# Patient Record
Sex: FEMALE | Race: WHITE | Employment: FULL TIME | URBAN - METROPOLITAN AREA
[De-identification: names, ages, dates, MRNs, and addresses within clinical notes are randomized per-mention and may not be internally consistent; named-entity substitution may affect disease eponyms.]

---

## 2017-01-30 ENCOUNTER — OFFICE VISIT (OUTPATIENT)
Dept: INTERNAL MEDICINE CLINIC | Age: 47
End: 2017-01-30

## 2017-01-30 VITALS
RESPIRATION RATE: 16 BRPM | DIASTOLIC BLOOD PRESSURE: 80 MMHG | OXYGEN SATURATION: 95 % | WEIGHT: 167.4 LBS | TEMPERATURE: 99.1 F | BODY MASS INDEX: 29.66 KG/M2 | HEART RATE: 92 BPM | HEIGHT: 63 IN | SYSTOLIC BLOOD PRESSURE: 100 MMHG

## 2017-01-30 DIAGNOSIS — F43.21 GRIEF: ICD-10-CM

## 2017-01-30 DIAGNOSIS — R03.0 ELEVATED BLOOD PRESSURE (NOT HYPERTENSION): ICD-10-CM

## 2017-01-30 DIAGNOSIS — Z00.00 PHYSICAL EXAM: Primary | ICD-10-CM

## 2017-01-30 DIAGNOSIS — R09.81 SINUS CONGESTION: ICD-10-CM

## 2017-01-30 RX ORDER — FLUTICASONE PROPIONATE 50 MCG
2 SPRAY, SUSPENSION (ML) NASAL DAILY
Qty: 1 BOTTLE | Refills: 11
Start: 2017-01-30 | End: 2018-07-09

## 2017-01-30 NOTE — PROGRESS NOTES
HISTORY OF PRESENT ILLNESS  Lakisha Dixon is a 48 y.o. female. OLIVE Cash Esteves is seen today for a CPE and follow up of chronic problems. Social history notable for a significant amount of stress and sadness related to the death of her son who suffered a drug overdose. She has excellent family support and feels she is working through things. She had some grief counseling. In addition, she is also having some stress with work. Preventive medicine. She due for a complete physical examination and routine screening laboratory studies. Also, due for gyn care which is scheduled for today with her gynecologist.  She declines the Tdap booster. New problems reviewed. She has elevated blood pressure at times, actually looks very good today. Continue to monitor and call if readings are persistently high. She has sinus pain and I have advised a nasal steroid. Review of systems notable for some neck pain and muscle pain. She also is having some hot flashes. Regarding chronic psychiatric illness, up to date with psychiatrist.    Yoko/elodia       We counseled regarding healthy lifestyle issues including diet, exercise and stress management. Family history, social history, etc. Are reviewed and updated, see electronic record. I have reviewed/discussed the above normal BMI with the patient. I have recommended the following interventions: dietary needs education and encourage exercise . The plan is as follows: I have counseled this patient on diet and exercise regimens. .        Review of Systems   Constitutional: Negative for weight loss. HENT: Positive for congestion. Eyes: Positive for pain. Respiratory: Negative. Cardiovascular: Negative for chest pain, palpitations, leg swelling and PND. Musculoskeletal: Positive for myalgias. Neurological: Negative for focal weakness. Psychiatric/Behavioral: Positive for depression. The patient has insomnia.         Physical Exam   Constitutional: She is oriented to person, place, and time. She appears well-developed and well-nourished. No distress. HENT:   Head: Normocephalic and atraumatic. Right Ear: Tympanic membrane, external ear and ear canal normal.   Left Ear: Tympanic membrane, external ear and ear canal normal.   Eyes: EOM are normal. Pupils are equal, round, and reactive to light. Right eye exhibits no discharge. Left eye exhibits no discharge. Neck: Normal range of motion. Neck supple. Carotid bruit is not present. No thyromegaly present. Cardiovascular: Normal rate, regular rhythm, normal heart sounds and intact distal pulses. Exam reveals no gallop and no friction rub. No murmur heard. Pulmonary/Chest: Effort normal and breath sounds normal. No respiratory distress. She has no wheezes. She has no rales. Abdominal: Soft. Bowel sounds are normal. She exhibits no distension and no mass. There is no tenderness. There is no rebound and no guarding. Musculoskeletal: Normal range of motion. She exhibits no edema or tenderness. Lymphadenopathy:     She has no cervical adenopathy. Neurological: She is alert and oriented to person, place, and time. She has normal reflexes. Skin: Skin is warm and dry. No rash noted. Psychiatric: She has a normal mood and affect. Her behavior is normal.   Nursing note and vitals reviewed. ASSESSMENT and PLAN  Charis Peguero was seen today for complete physical and sinus pain. Diagnoses and all orders for this visit:    Physical exam  -     TSH 3RD GENERATION  -     VITAMIN D, 25 HYDROXY  -     METABOLIC PANEL, COMPREHENSIVE  -     CBC WITH AUTOMATED DIFF  -     HEMOGLOBIN A1C WITH EAG  -     LIPID PANEL  -     URINALYSIS W/ RFLX MICROSCOPIC    Grief- see hpi    Elevated blood pressure (not hypertension)- see hpi    Sinus congestion  -     fluticasone (FLONASE) 50 mcg/actuation nasal spray; 2 Sprays by Both Nostrils route daily.     This has been fully explained to the patient, who indicates understanding.

## 2017-01-30 NOTE — MR AVS SNAPSHOT
Visit Information Date & Time Provider Department Dept. Phone Encounter #  
 1/30/2017 10:00 AM Brielle Luke, [facility] Internal Medicine [phone] [id number] Follow-up Instructions Return in about 1 year (around 1/30/2018). Upcoming Health Maintenance Date Due  
 BREAST CANCER SCRN MAMMOGRAM 11/1/2015 PAP AKA CERVICAL CYTOLOGY 7/1/2017 DTaP/Tdap/Td series (2 - Td) 1/30/2027 Allergies as of 1/30/2017  Review Complete On: 1/30/2017 By: Brielle Luke MD  
  
 Severity Noted Reaction Type Reactions Aleve [Naproxen Sodium]  11/20/2009    Other (comments) Oral ulcer Nsaids (Non-steroidal Anti-inflammatory Drug)  11/23/2010    Other (comments) Ulcerated mucous membranes Pcn [Penicillins]  11/20/2009    Hives Percocet [Oxycodone-acetaminophen]  11/20/2009    Itching Current Immunizations  Reviewed on 1/30/2017 Name Date  
 TD Vaccine 11/1/2007 Reviewed by Brielle Luke MD on 1/30/2017 at 10:56 AM  
You Were Diagnosed With   
  
 Codes Comments Physical exam    -  Primary ICD-10-CM: Z00.00 ICD-9-CM: V70.9 Grief     ICD-10-CM: M48.85 ICD-9-CM: 309.0 Elevated blood pressure (not hypertension)     ICD-10-CM: R03.0 ICD-9-CM: 796.2 Sinus congestion     ICD-10-CM: R09.81 ICD-9-CM: 478.19 Vitals BP Pulse Temp Resp Height(growth percentile) Weight(growth percentile) 100/80 (BP 1 Location: Right arm, BP Patient Position: Sitting) 92 99.1 °F (37.3 °C) (Oral) 16 5' 3\" (1.6 m) 167 lb 6.4 oz (75.9 kg) SpO2 BMI OB Status Smoking Status 95% 29.65 kg/m2 Hysterectomy Former Smoker BMI and BSA Data Body Mass Index Body Surface Area  
 29.65 kg/m 2 1.84 m 2 Preferred Pharmacy Pharmacy Name Phone [id number] Analia Mccormick, [address] [place] [address] [phone] Your Updated Medication List  
  
   
 This list is accurate as of: 1/30/17 11:04 AM.  Always use your most recent med list.  
  
  
  
  
 ATIVAN 0.5 mg tablet Generic drug:  LORazepam  
Take  by mouth. Biotin 2,500 mcg Cap Take  by mouth. cholecalciferol 1,000 unit tablet Commonly known as:  VITAMIN D3 Take 5 Tabs by mouth daily. Takes as a 5000 unit cap EPIDUO 0.1-2.5 % Gel Generic drug:  Adapalene-Benzoyl Peroxide  
by Apply Externally route. FISH OIL 1,000 mg Cap Generic drug:  omega-3 fatty acids-vitamin e Take 1 Cap by mouth nightly. fluticasone 50 mcg/actuation nasal spray Commonly known as:  Cathlyn Jefry 2 Sprays by Both Nostrils route daily. LaMICtal 150 mg tablet Generic drug:  lamoTRIgine Take  by mouth daily. traMADol 50 mg tablet Commonly known as:  ULTRAM  
Take 1 tablet by mouth every six (6) hours as needed for Pain. tretinoin 0.1 % topical cream  
Commonly known as:  RETIN-A  
  
 TYLENOL 325 mg tablet Generic drug:  acetaminophen Take  by mouth every four (4) hours as needed. valACYclovir 1 gram tablet Commonly known as:  VALTREX  
TAKE AS DIRECTED We Performed the Following CBC WITH AUTOMATED DIFF [69198 CPT(R)] HEMOGLOBIN A1C WITH EAG [78017 CPT(R)] LIPID PANEL [14825 CPT(R)] METABOLIC PANEL, COMPREHENSIVE [50342 CPT(R)] TSH 3RD GENERATION [58999 CPT(R)] URINALYSIS W/ RFLX MICROSCOPIC [29245 CPT(R)] VITAMIN D, 25 HYDROXY [id number] CPT(R)] Follow-up Instructions Return in about 1 year (around 1/30/2018). Introducing [facility]! Dear Kyree Anderson: 
Thank you for requesting a [employer] account. Our records indicate that you already have an active [employer] account. You can access your account anytime at [email or web address] Did you know that you can access your hospital and ER discharge instructions at any time in [employer]?   You can also review all of your test results from your hospital stay or ER visit. Additional Information If you have questions, please visit the Frequently Asked Questions section of the [employer] website at [email or web address] Remember, [employer] is NOT to be used for urgent needs. For medical emergencies, dial 911. Now available from your iPhone and Android! Please provide this summary of care documentation to your next provider. Your primary care clinician is listed as TERRELL RO. If you have any questions after today's visit, please call [phone].

## 2017-01-31 LAB
25(OH)D3+25(OH)D2 SERPL-MCNC: 25 NG/ML (ref 30–100)
ALBUMIN SERPL-MCNC: 4.6 G/DL (ref 3.5–5.5)
ALBUMIN/GLOB SERPL: 1.9 {RATIO} (ref 1.1–2.5)
ALP SERPL-CCNC: 86 IU/L (ref 39–117)
ALT SERPL-CCNC: 21 IU/L (ref 0–32)
APPEARANCE UR: CLEAR
AST SERPL-CCNC: 20 IU/L (ref 0–40)
BASOPHILS # BLD AUTO: 0 X10E3/UL (ref 0–0.2)
BASOPHILS NFR BLD AUTO: 1 %
BILIRUB SERPL-MCNC: 0.4 MG/DL (ref 0–1.2)
BILIRUB UR QL STRIP: NEGATIVE
BUN SERPL-MCNC: 12 MG/DL (ref 6–24)
BUN/CREAT SERPL: 15 (ref 9–23)
CALCIUM SERPL-MCNC: 9.8 MG/DL (ref 8.7–10.2)
CHLORIDE SERPL-SCNC: 100 MMOL/L (ref 96–106)
CHOLEST SERPL-MCNC: 269 MG/DL (ref 100–199)
CO2 SERPL-SCNC: 26 MMOL/L (ref 18–29)
COLOR UR: YELLOW
COMMENT, 011824: ABNORMAL
CREAT SERPL-MCNC: 0.81 MG/DL (ref 0.57–1)
EOSINOPHIL # BLD AUTO: 0.1 X10E3/UL (ref 0–0.4)
EOSINOPHIL NFR BLD AUTO: 2 %
ERYTHROCYTE [DISTWIDTH] IN BLOOD BY AUTOMATED COUNT: 13.4 % (ref 12.3–15.4)
EST. AVERAGE GLUCOSE BLD GHB EST-MCNC: 126 MG/DL
GLOBULIN SER CALC-MCNC: 2.4 G/DL (ref 1.5–4.5)
GLUCOSE SERPL-MCNC: 92 MG/DL (ref 65–99)
GLUCOSE UR QL: NEGATIVE
HBA1C MFR BLD: 6 % (ref 4.8–5.6)
HCT VFR BLD AUTO: 46.5 % (ref 34–46.6)
HDLC SERPL-MCNC: 51 MG/DL
HGB BLD-MCNC: 16 G/DL (ref 11.1–15.9)
HGB UR QL STRIP: NEGATIVE
IMM GRANULOCYTES # BLD: 0 X10E3/UL (ref 0–0.1)
IMM GRANULOCYTES NFR BLD: 0 %
KETONES UR QL STRIP: NEGATIVE
LDLC SERPL CALC-MCNC: 198 MG/DL (ref 0–99)
LEUKOCYTE ESTERASE UR QL STRIP: NEGATIVE
LYMPHOCYTES # BLD AUTO: 2 X10E3/UL (ref 0.7–3.1)
LYMPHOCYTES NFR BLD AUTO: 26 %
MCH RBC QN AUTO: 31.6 PG (ref 26.6–33)
MCHC RBC AUTO-ENTMCNC: 34.4 G/DL (ref 31.5–35.7)
MCV RBC AUTO: 92 FL (ref 79–97)
MICRO URNS: NORMAL
MONOCYTES # BLD AUTO: 0.5 X10E3/UL (ref 0.1–0.9)
MONOCYTES NFR BLD AUTO: 6 %
NEUTROPHILS # BLD AUTO: 4.9 X10E3/UL (ref 1.4–7)
NEUTROPHILS NFR BLD AUTO: 65 %
NITRITE UR QL STRIP: NEGATIVE
PH UR STRIP: 6 [PH] (ref 5–7.5)
PLATELET # BLD AUTO: 306 X10E3/UL (ref 150–379)
POTASSIUM SERPL-SCNC: 4.2 MMOL/L (ref 3.5–5.2)
PROT SERPL-MCNC: 7 G/DL (ref 6–8.5)
PROT UR QL STRIP: NORMAL
RBC # BLD AUTO: 5.07 X10E6/UL (ref 3.77–5.28)
SODIUM SERPL-SCNC: 141 MMOL/L (ref 134–144)
SP GR UR: 1.03 (ref 1–1.03)
TRIGL SERPL-MCNC: 102 MG/DL (ref 0–149)
TSH SERPL DL<=0.005 MIU/L-ACNC: 1.31 UIU/ML (ref 0.45–4.5)
UROBILINOGEN UR STRIP-MCNC: 0.2 MG/DL (ref 0.2–1)
VLDLC SERPL CALC-MCNC: 20 MG/DL (ref 5–40)
WBC # BLD AUTO: 7.5 X10E3/UL (ref 3.4–10.8)

## 2017-02-06 DIAGNOSIS — E78.5 HYPERLIPIDEMIA, UNSPECIFIED HYPERLIPIDEMIA TYPE: Primary | ICD-10-CM

## 2017-05-03 ENCOUNTER — DOCUMENTATION ONLY (OUTPATIENT)
Dept: INTERNAL MEDICINE CLINIC | Age: 47
End: 2017-05-03

## 2017-05-03 NOTE — PROGRESS NOTES
[facility] is requesting office notes/labs on patient for an upcoming appointment, 6.12.17. Per Dr. Azalea Hough, patient needs to sign an OPHELIA form. After a failed attempt to call the patient, an OPHELIA form was mailed to Ms. Stephen Bland today with a self-addressed stamped envelope. Will await for this to be sent back before sending any office notes.

## 2017-05-04 ENCOUNTER — TELEPHONE (OUTPATIENT)
Dept: INTERNAL MEDICINE CLINIC | Age: 47
End: 2017-05-04

## 2017-05-04 NOTE — TELEPHONE ENCOUNTER
Advised pt I did not see in chart where anyone from our office had called. May have been an automated call. She states she would check her message again when she gets home - thought it was from us.

## 2017-05-23 ENCOUNTER — TELEPHONE (OUTPATIENT)
Dept: INTERNAL MEDICINE CLINIC | Age: 47
End: 2017-05-23

## 2017-05-23 NOTE — TELEPHONE ENCOUNTER
Patient is very dissatisfied with the phychiatrist she has been seeing for 16 years. Is asking for Dr. Ricardo Camarillo recommendation.

## 2017-05-25 NOTE — TELEPHONE ENCOUNTER
Spoke with pt - advised MD asked me to mail our list to her - he has circled the groups he likes to use. In the meantime she should call her insurance company to see which one is in her network.

## 2017-08-01 LAB
CHOLEST SERPL-MCNC: 224 MG/DL (ref 100–199)
HDLC SERPL-MCNC: 56 MG/DL
LDLC SERPL CALC-MCNC: 152 MG/DL (ref 0–99)
TRIGL SERPL-MCNC: 79 MG/DL (ref 0–149)
VLDLC SERPL CALC-MCNC: 16 MG/DL (ref 5–40)

## 2018-02-03 RX ORDER — VALACYCLOVIR HYDROCHLORIDE 1 G/1
TABLET, FILM COATED ORAL
Qty: 12 TAB | Refills: 1 | Status: SHIPPED | OUTPATIENT
Start: 2018-02-03 | End: 2019-05-16 | Stop reason: SDUPTHER

## 2018-07-02 DIAGNOSIS — R73.09 OTHER ABNORMAL GLUCOSE: Primary | ICD-10-CM

## 2018-07-02 DIAGNOSIS — Z79.899 ENCOUNTER FOR LONG-TERM (CURRENT) USE OF MEDICATIONS: ICD-10-CM

## 2018-07-02 DIAGNOSIS — E55.9 VITAMIN D DEFICIENCY: ICD-10-CM

## 2018-07-02 DIAGNOSIS — E78.49 OTHER HYPERLIPIDEMIA: ICD-10-CM

## 2018-07-03 LAB
25(OH)D3+25(OH)D2 SERPL-MCNC: 31.7 NG/ML (ref 30–100)
ALBUMIN SERPL-MCNC: 4.5 G/DL (ref 3.5–5.5)
ALBUMIN/GLOB SERPL: 2 {RATIO} (ref 1.2–2.2)
ALP SERPL-CCNC: 81 IU/L (ref 39–117)
ALT SERPL-CCNC: 22 IU/L (ref 0–32)
APPEARANCE UR: CLEAR
AST SERPL-CCNC: 23 IU/L (ref 0–40)
BASOPHILS # BLD AUTO: 0 X10E3/UL (ref 0–0.2)
BASOPHILS NFR BLD AUTO: 0 %
BILIRUB SERPL-MCNC: 0.3 MG/DL (ref 0–1.2)
BILIRUB UR QL STRIP: NEGATIVE
BUN SERPL-MCNC: 14 MG/DL (ref 6–24)
BUN/CREAT SERPL: 15 (ref 9–23)
CALCIUM SERPL-MCNC: 9.5 MG/DL (ref 8.7–10.2)
CHLORIDE SERPL-SCNC: 106 MMOL/L (ref 96–106)
CHOLEST SERPL-MCNC: 218 MG/DL (ref 100–199)
CO2 SERPL-SCNC: 23 MMOL/L (ref 20–29)
COLOR UR: YELLOW
CREAT SERPL-MCNC: 0.94 MG/DL (ref 0.57–1)
EOSINOPHIL # BLD AUTO: 0.2 X10E3/UL (ref 0–0.4)
EOSINOPHIL NFR BLD AUTO: 3 %
ERYTHROCYTE [DISTWIDTH] IN BLOOD BY AUTOMATED COUNT: 14 % (ref 12.3–15.4)
EST. AVERAGE GLUCOSE BLD GHB EST-MCNC: 120 MG/DL
GLOBULIN SER CALC-MCNC: 2.2 G/DL (ref 1.5–4.5)
GLUCOSE SERPL-MCNC: 95 MG/DL (ref 65–99)
GLUCOSE UR QL: NEGATIVE
HBA1C MFR BLD: 5.8 % (ref 4.8–5.6)
HCT VFR BLD AUTO: 42.2 % (ref 34–46.6)
HDLC SERPL-MCNC: 51 MG/DL
HGB BLD-MCNC: 14.2 G/DL (ref 11.1–15.9)
HGB UR QL STRIP: NEGATIVE
IMM GRANULOCYTES # BLD: 0 X10E3/UL (ref 0–0.1)
IMM GRANULOCYTES NFR BLD: 0 %
KETONES UR QL STRIP: NEGATIVE
LDLC SERPL CALC-MCNC: 146 MG/DL (ref 0–99)
LEUKOCYTE ESTERASE UR QL STRIP: NEGATIVE
LYMPHOCYTES # BLD AUTO: 1.8 X10E3/UL (ref 0.7–3.1)
LYMPHOCYTES NFR BLD AUTO: 33 %
MCH RBC QN AUTO: 31.5 PG (ref 26.6–33)
MCHC RBC AUTO-ENTMCNC: 33.6 G/DL (ref 31.5–35.7)
MCV RBC AUTO: 94 FL (ref 79–97)
MICRO URNS: NORMAL
MONOCYTES # BLD AUTO: 0.4 X10E3/UL (ref 0.1–0.9)
MONOCYTES NFR BLD AUTO: 7 %
NEUTROPHILS # BLD AUTO: 3.2 X10E3/UL (ref 1.4–7)
NEUTROPHILS NFR BLD AUTO: 57 %
NITRITE UR QL STRIP: NEGATIVE
PH UR STRIP: 5 [PH] (ref 5–7.5)
PLATELET # BLD AUTO: 275 X10E3/UL (ref 150–379)
POTASSIUM SERPL-SCNC: 4.6 MMOL/L (ref 3.5–5.2)
PROT SERPL-MCNC: 6.7 G/DL (ref 6–8.5)
PROT UR QL STRIP: NEGATIVE
RBC # BLD AUTO: 4.51 X10E6/UL (ref 3.77–5.28)
SODIUM SERPL-SCNC: 144 MMOL/L (ref 134–144)
SP GR UR: 1.02 (ref 1–1.03)
TRIGL SERPL-MCNC: 104 MG/DL (ref 0–149)
UROBILINOGEN UR STRIP-MCNC: 0.2 MG/DL (ref 0.2–1)
VLDLC SERPL CALC-MCNC: 21 MG/DL (ref 5–40)
WBC # BLD AUTO: 5.6 X10E3/UL (ref 3.4–10.8)

## 2018-07-09 ENCOUNTER — OFFICE VISIT (OUTPATIENT)
Dept: INTERNAL MEDICINE CLINIC | Age: 48
End: 2018-07-09

## 2018-07-09 VITALS
DIASTOLIC BLOOD PRESSURE: 90 MMHG | BODY MASS INDEX: 31.25 KG/M2 | WEIGHT: 176.4 LBS | SYSTOLIC BLOOD PRESSURE: 128 MMHG | HEART RATE: 86 BPM | OXYGEN SATURATION: 98 % | TEMPERATURE: 98 F | HEIGHT: 63 IN | RESPIRATION RATE: 16 BRPM

## 2018-07-09 DIAGNOSIS — E78.49 OTHER HYPERLIPIDEMIA: Primary | ICD-10-CM

## 2018-07-09 DIAGNOSIS — N95.1 PERIMENOPAUSAL: ICD-10-CM

## 2018-07-09 DIAGNOSIS — R73.09 OTHER ABNORMAL GLUCOSE: ICD-10-CM

## 2018-07-09 DIAGNOSIS — Z12.11 SCREEN FOR COLON CANCER: ICD-10-CM

## 2018-07-09 RX ORDER — DOXYCYCLINE HYCLATE 100 MG
TABLET ORAL
Refills: 1 | COMMUNITY
Start: 2018-06-10 | End: 2019-06-05 | Stop reason: ALTCHOICE

## 2018-07-09 RX ORDER — DESONIDE 0.5 MG/G
CREAM TOPICAL
Refills: 0 | COMMUNITY
Start: 2018-04-09

## 2018-07-09 RX ORDER — IVERMECTIN 10 MG/G
CREAM TOPICAL
COMMUNITY
Start: 2018-04-05 | End: 2019-06-05 | Stop reason: ALTCHOICE

## 2018-07-09 NOTE — MR AVS SNAPSHOT
[address] 
[phone] Patient: Baltazar Anderson MRN: [id number] Visit Information Date & Time Provider Department Dept. Phone Encounter #  
 7/9/2018  2:20 PM Rao Arechiga, [facility] Internal Medicine [phone] [id number] Follow-up Instructions Return in about 1 year (around 7/9/2019). Upcoming Health Maintenance Date Due  
 BREAST CANCER SCRN MAMMOGRAM 11/1/2015 PAP AKA CERVICAL CYTOLOGY 7/1/2017 FOBT Q 1 YEAR AGE 50-75 4/17/2018 Influenza Age 5 to Adult 8/1/2018 DTaP/Tdap/Td series (2 - Td) 1/30/2027 Allergies as of 7/9/2018  Review Complete On: 7/9/2018 By: Rao Arechiga MD  
  
 Severity Noted Reaction Type Reactions Aleve [Naproxen Sodium]  11/20/2009    Other (comments) Oral ulcer Nsaids (Non-steroidal Anti-inflammatory Drug)  11/23/2010    Other (comments) Ulcerated mucous membranes Pcn [Penicillins]  11/20/2009    Hives Percocet [Oxycodone-acetaminophen]  11/20/2009    Itching Current Immunizations  Reviewed on 1/30/2017 Name Date  
 TD Vaccine 11/1/2007 Not reviewed this visit You Were Diagnosed With   
  
 Codes Comments Other hyperlipidemia    -  Primary ICD-10-CM: E78.4 ICD-9-CM: 272.4 Other abnormal glucose     ICD-10-CM: R73.09 
ICD-9-CM: 790.29 Perimenopausal     ICD-10-CM: N95.1 ICD-9-CM: 627.2 Screen for colon cancer     ICD-10-CM: Z12.11 ICD-9-CM: V76.51 Vitals BP Pulse Temp Resp Height(growth percentile) Weight(growth percentile) 128/90 (BP 1 Location: Left arm, BP Patient Position: Sitting) 86 98 °F (36.7 °C) (Oral) 16 5' 3\" (1.6 m) 176 lb 6.4 oz (80 kg) SpO2 BMI OB Status Smoking Status 98% 31.25 kg/m2 Hysterectomy Former Smoker Vitals History BMI and BSA Data  Body Mass Index Body Surface Area  
 31.25 kg/m 2 1.89 m 2  
  
  
 Preferred Pharmacy Pharmacy Name Phone [id number] Chano Moody, [address] [facility] [phone] Your Updated Medication List  
  
   
This list is accurate as of 7/9/18  3:31 PM.  Always use your most recent med list.  
  
  
  
  
 ATIVAN 0.5 mg tablet Generic drug:  LORazepam  
Take  by mouth. cholecalciferol 1,000 unit tablet Commonly known as:  VITAMIN D3 Take 5 Tabs by mouth daily. Takes as a 5000 unit cap  
  
 desonide 0.05 % cream  
Commonly known as:  TRIDESILON  
APPLY 1 APPLICATION TO FACE TWICE A DAY EXTERNALLY FOR 10 DAYS  
  
 doxycycline 100 mg tablet Commonly known as:  VIBRA-TABS TAKE 1 TABLET BY MOUTH ONCE A DAY  
  
 EPIDUO 0.1-2.5 % Gel Generic drug:  Adapalene-Benzoyl Peroxide  
by Apply Externally route. FISH OIL 1,000 mg Cap Generic drug:  omega-3 fatty acids-vitamin e Take 1 Cap by mouth nightly. LaMICtal 150 mg tablet Generic drug:  lamoTRIgine Take  by mouth daily. SOOLANTRA 1 % Crea Generic drug:  ivermectin  
  
 tretinoin 0.1 % topical cream  
Commonly known as:  RETIN-A  
  
 TYLENOL 325 mg tablet Generic drug:  acetaminophen Take  by mouth every four (4) hours as needed. valACYclovir 1 gram tablet Commonly known as:  VALTREX  
TAKE AS DIRECTED We Performed the Following REFERRAL TO GASTROENTEROLOGY [SJK01 Custom] Follow-up Instructions Return in about 1 year (around 7/9/2019). Referral Information Referral ID Referred By Referred To  
  
 [id number] TERRELL RO Not Available Visits Status Start Date End Date 1 New Request 7/9/18 7/9/19 If your referral has a status of pending review or denied, additional information will be sent to support the outcome of this decision. Introducing [facility]! Dear Morro Steel: 
Thank you for requesting a [employer] account.   Our records indicate that you already have an active [employer] account. You can access your account anytime at [email or web address] Did you know that you can access your hospital and ER discharge instructions at any time in [employer]? You can also review all of your test results from your hospital stay or ER visit. Additional Information If you have questions, please visit the Frequently Asked Questions section of the [employer] website at [email or web address] Remember, [employer] is NOT to be used for urgent needs. For medical emergencies, dial 911. Now available from your iPhone and Android! Please provide this summary of care documentation to your next provider. Your primary care clinician is listed as TERRELL RO. If you have any questions after today's visit, please call [phone].

## 2018-07-09 NOTE — PROGRESS NOTES
HISTORY OF PRESENT ILLNESS  Sonali Wilkins is a 46 y.o. female. OLIVE Chahal is seen today for follow up of hyperlipidemia and evaluation of back pain. 1. Hyperlipidemia, IFG. Reviewed labs, generally stable. She is not a candidate for medication treatment for cholesterol based on current guidelines. Encouraged continued efforts at diet and exercise. 2. Back pain, chronic. She rates intensity of pain at 6/10. She works on stretching and does not feel a need for any particular medication. Of note, she called later in the week and requested Flexeril which  I think is fine. 3. Preventive medicine. Brief review today. Review of systems notable for perimenopausal symptoms of hot flashes. She feels miserable overall. She also has some fluid retention which she describes as mild. I have asked her to discuss this with her GYN physician as she may be a candidate for short term HRT. I did review the potential side effects of this line of treatment, but generally a short course of treatment does not bear a terrible risk. Current Outpatient Prescriptions   Medication Sig    desonide (TRIDESILON) 0.05 % cream APPLY 1 APPLICATION TO FACE TWICE A DAY EXTERNALLY FOR 10 DAYS    doxycycline (VIBRA-TABS) 100 mg tablet TAKE 1 TABLET BY MOUTH ONCE A DAY    SOOLANTRA 1 % crea     valACYclovir (VALTREX) 1 gram tablet TAKE AS DIRECTED    Adapalene-Benzoyl Peroxide (EPIDUO) 0.1-2.5 % gel by Apply Externally route.  acetaminophen (TYLENOL) 325 mg tablet Take  by mouth every four (4) hours as needed.  LORazepam (ATIVAN) 0.5 mg tablet Take  by mouth.  omega-3 fatty acids-vitamin e (FISH OIL) 1,000 mg cap Take 1 Cap by mouth nightly.  cholecalciferol (VITAMIN D3) 1,000 unit tablet Take 5 Tabs by mouth daily. Takes as a 5000 unit cap    tretinoin (RETIN-A) 0.1 % topical cream     lamotrigine (LAMICTAL) 150 mg tablet Take  by mouth daily. No current facility-administered medications for this visit. Review of Systems   Constitutional: Negative for weight loss. Respiratory: Negative. Cardiovascular: Positive for leg swelling. Negative for chest pain, palpitations and PND. Musculoskeletal: Positive for back pain. Negative for myalgias. Neurological: Negative for focal weakness. Physical Exam   Constitutional: She appears well-nourished. Neck: Carotid bruit is not present. Cardiovascular: Normal rate and regular rhythm. Exam reveals no gallop and no friction rub. No murmur heard. Pulmonary/Chest: Effort normal and breath sounds normal. No respiratory distress. Musculoskeletal: She exhibits edema. Trace stasis edema bilateral lower extremities. Nursing note and vitals reviewed. ASSESSMENT and PLAN  Diagnoses and all orders for this visit:    1. Other hyperlipidemia- Diet and exercise     2. Other abnormal glucose- Diet and exercise     3. Perimenopausal- See gynecologist as discussed/directed     4.  Screen for colon cancer  -     REFERRAL TO GASTROENTEROLOGY

## 2018-07-09 NOTE — PROGRESS NOTES
Chief Complaint   Patient presents with    Medication Evaluation     1. Have you been to the ER, urgent care clinic since your last visit? Hospitalized since your last visit? No    2. Have you seen or consulted any other health care providers outside of the [facility] since your last visit? Include any pap smears or colon screening.  No

## 2018-07-12 ENCOUNTER — TELEPHONE (OUTPATIENT)
Dept: INTERNAL MEDICINE CLINIC | Age: 48
End: 2018-07-12

## 2018-07-13 NOTE — TELEPHONE ENCOUNTER
Spoke with pt - states she forgot to mention this at her recent appt but has had hx of back pain for years. Recently lower back seems to be \"cramping up\" - feels may be due to new job and sitting more. States MD gave her Flexeril 10 mg several years ago for this - which helped. She usually takes 1/2 tab to make Rx last longer. Wanted to know if MD would send in new Rx?  Will forward to MD.

## 2018-07-16 RX ORDER — CYCLOBENZAPRINE HCL 10 MG
10 TABLET ORAL
Qty: 30 TAB | Refills: 2 | Status: SHIPPED | OUTPATIENT
Start: 2018-07-16

## 2019-05-16 RX ORDER — VALACYCLOVIR HYDROCHLORIDE 1 G/1
TABLET, FILM COATED ORAL
Qty: 12 TAB | Refills: 0 | Status: SHIPPED | OUTPATIENT
Start: 2019-05-16 | End: 2020-07-13

## 2019-06-05 ENCOUNTER — OFFICE VISIT (OUTPATIENT)
Dept: INTERNAL MEDICINE CLINIC | Age: 49
End: 2019-06-05

## 2019-06-05 VITALS
BODY MASS INDEX: 30.58 KG/M2 | WEIGHT: 172.6 LBS | TEMPERATURE: 98.6 F | OXYGEN SATURATION: 96 % | SYSTOLIC BLOOD PRESSURE: 132 MMHG | DIASTOLIC BLOOD PRESSURE: 84 MMHG | HEART RATE: 101 BPM | HEIGHT: 63 IN | RESPIRATION RATE: 16 BRPM

## 2019-06-05 DIAGNOSIS — M79.604 PAIN IN BOTH LOWER EXTREMITIES: Primary | ICD-10-CM

## 2019-06-05 DIAGNOSIS — M25.50 ARTHRALGIA, UNSPECIFIED JOINT: ICD-10-CM

## 2019-06-05 DIAGNOSIS — M79.605 PAIN IN BOTH LOWER EXTREMITIES: Primary | ICD-10-CM

## 2019-06-05 DIAGNOSIS — R22.43 LOCALIZED SWELLING OF BOTH LOWER LEGS: ICD-10-CM

## 2019-06-05 RX ORDER — ESTRADIOL 0.5 MG/1
TABLET ORAL
Refills: 12 | COMMUNITY
Start: 2019-05-16

## 2019-06-05 NOTE — PROGRESS NOTES
HISTORY OF PRESENT ILLNESS  Susi Garcia is a 44 y.o. female. HPI Subjective:  Lety Villanueva is seen today for evaluation of leg swelling and joint pains. She has had leg edema, bilateral, for about the last two weeks. This is associated with bilateral knee pain with a sensation of swelling, especially posteriorly. She denies specific injury. She has also had some bilateral wrist and elbow pain. She denies any fevers or chills. She did have two tick bites, the first the end of March, the second mid April. She denies any particularly unusual rash. The ticks were attached for less than 24 hours. She had an unusual neurologic type episode where she had a sense of meredith vu and a dream like state. This occurred about 11 days ago and lasted 6 seconds. She wonders if she might have had temporal lobe seizures. She has been under a great deal of work stress. 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 I told her I doubt that stress is causing her symptoms, but certainly could aggravate. We have decided on a plan of lab analysis for her joint symptoms. Different possibilities were reviewed. If the symptoms persist beyond two weeks she will schedule a rheumatology consultation and names were provided. DVT will be ruled out on an ASAP basis. Past medical history is notable for several bouts of uveitis. She has never been diagnosed with any specific inflammatory or autoimmune condition. Review of Systems   Constitutional: Negative for chills and fever. Respiratory: Negative for shortness of breath. Cardiovascular: Positive for leg swelling. Negative for chest pain. Musculoskeletal: Positive for joint pain. Skin: Positive for rash. Psychiatric/Behavioral: The patient is nervous/anxious. Physical Exam   Constitutional: No distress. Cardiovascular: Normal rate and regular rhythm. Exam reveals no gallop and no friction rub.    No murmur heard. Pulmonary/Chest: Effort normal and breath sounds normal.   Musculoskeletal: She exhibits edema. Right wrist: She exhibits tenderness. She exhibits normal range of motion, no bony tenderness, no swelling and no effusion. Right knee: She exhibits swelling. She exhibits normal range of motion, no effusion, no deformity and no erythema. Arms:       Legs:  Moderate bilateral lower extremity edema , mainly feet and ankles   Skin: Rash noted. Nursing note and vitals reviewed. ASSESSMENT and PLAN  Diagnoses and all orders for this visit:    1. Pain in both lower extremities  -     DUPLEX LOWER EXT VENOUS BILAT; Future    2. Localized swelling of both lower legs  -     DUPLEX LOWER EXT VENOUS BILAT; Future    3.  Arthralgia, unspecified joint  -     METABOLIC PANEL, COMPREHENSIVE  -     CBC WITH AUTOMATED DIFF  -     TSH 3RD GENERATION  -     URINALYSIS W/ RFLX MICROSCOPIC  -     LYME AB TOTAL W/RFLX W BLOT  -     SED RATE (ESR)  -     C REACTIVE PROTEIN, QT  -     PARVOVIRUS B19 AB  -     REFERRAL TO RHEUMATOLOGY  -     RHEUMATOID FACTOR, QT

## 2019-06-06 LAB
ALBUMIN SERPL-MCNC: 4.3 G/DL (ref 3.5–5.5)
ALBUMIN/GLOB SERPL: 1.4 {RATIO} (ref 1.2–2.2)
ALP SERPL-CCNC: 115 IU/L (ref 39–117)
ALT SERPL-CCNC: 21 IU/L (ref 0–32)
APPEARANCE UR: CLEAR
AST SERPL-CCNC: 18 IU/L (ref 0–40)
B BURGDOR IGG+IGM SER-ACNC: <0.91 ISR (ref 0–0.9)
B19V IGG SER IA-ACNC: 6.7 INDEX (ref 0–0.8)
B19V IGM SER IA-ACNC: 0.3 INDEX (ref 0–0.8)
BASOPHILS # BLD AUTO: 0 X10E3/UL (ref 0–0.2)
BASOPHILS NFR BLD AUTO: 0 %
BILIRUB SERPL-MCNC: 0.3 MG/DL (ref 0–1.2)
BILIRUB UR QL STRIP: NEGATIVE
BUN SERPL-MCNC: 8 MG/DL (ref 6–24)
BUN/CREAT SERPL: 10 (ref 9–23)
CALCIUM SERPL-MCNC: 9.6 MG/DL (ref 8.7–10.2)
CHLORIDE SERPL-SCNC: 102 MMOL/L (ref 96–106)
CO2 SERPL-SCNC: 23 MMOL/L (ref 20–29)
COLOR UR: YELLOW
CREAT SERPL-MCNC: 0.82 MG/DL (ref 0.57–1)
CRP SERPL-MCNC: 21.7 MG/L (ref 0–4.9)
EOSINOPHIL # BLD AUTO: 0.2 X10E3/UL (ref 0–0.4)
EOSINOPHIL NFR BLD AUTO: 2 %
ERYTHROCYTE [DISTWIDTH] IN BLOOD BY AUTOMATED COUNT: 14 % (ref 12.3–15.4)
ERYTHROCYTE [SEDIMENTATION RATE] IN BLOOD BY WESTERGREN METHOD: 15 MM/HR (ref 0–40)
GLOBULIN SER CALC-MCNC: 3 G/DL (ref 1.5–4.5)
GLUCOSE SERPL-MCNC: 86 MG/DL (ref 65–99)
GLUCOSE UR QL: NEGATIVE
HCT VFR BLD AUTO: 40.7 % (ref 34–46.6)
HGB BLD-MCNC: 14.2 G/DL (ref 11.1–15.9)
HGB UR QL STRIP: NEGATIVE
IMM GRANULOCYTES # BLD AUTO: 0 X10E3/UL (ref 0–0.1)
IMM GRANULOCYTES NFR BLD AUTO: 0 %
KETONES UR QL STRIP: NEGATIVE
LEUKOCYTE ESTERASE UR QL STRIP: NEGATIVE
LYMPHOCYTES # BLD AUTO: 1.4 X10E3/UL (ref 0.7–3.1)
LYMPHOCYTES NFR BLD AUTO: 18 %
MCH RBC QN AUTO: 31.6 PG (ref 26.6–33)
MCHC RBC AUTO-ENTMCNC: 34.9 G/DL (ref 31.5–35.7)
MCV RBC AUTO: 90 FL (ref 79–97)
MICRO URNS: NORMAL
MONOCYTES # BLD AUTO: 0.5 X10E3/UL (ref 0.1–0.9)
MONOCYTES NFR BLD AUTO: 6 %
NEUTROPHILS # BLD AUTO: 5.7 X10E3/UL (ref 1.4–7)
NEUTROPHILS NFR BLD AUTO: 74 %
NITRITE UR QL STRIP: NEGATIVE
PH UR STRIP: 6 [PH] (ref 5–7.5)
PLATELET # BLD AUTO: 386 X10E3/UL (ref 150–450)
POTASSIUM SERPL-SCNC: 4.2 MMOL/L (ref 3.5–5.2)
PROT SERPL-MCNC: 7.3 G/DL (ref 6–8.5)
PROT UR QL STRIP: NEGATIVE
RBC # BLD AUTO: 4.5 X10E6/UL (ref 3.77–5.28)
SODIUM SERPL-SCNC: 142 MMOL/L (ref 134–144)
SP GR UR: 1.01 (ref 1–1.03)
TSH SERPL DL<=0.005 MIU/L-ACNC: 1.64 UIU/ML (ref 0.45–4.5)
UROBILINOGEN UR STRIP-MCNC: 0.2 MG/DL (ref 0.2–1)
WBC # BLD AUTO: 7.8 X10E3/UL (ref 3.4–10.8)

## 2019-06-07 ENCOUNTER — TELEPHONE (OUTPATIENT)
Dept: INTERNAL MEDICINE CLINIC | Age: 49
End: 2019-06-07

## 2019-06-07 NOTE — TELEPHONE ENCOUNTER
Ara Reece (Self) [phone] (M)     Pt is requesting a call back regarding some labs that he has ordered for her, she has some questions.

## 2019-07-02 ENCOUNTER — TELEPHONE (OUTPATIENT)
Dept: INTERNAL MEDICINE CLINIC | Age: 49
End: 2019-07-02

## 2019-07-02 DIAGNOSIS — Z00.00 ROUTINE GENERAL MEDICAL EXAMINATION AT A HEALTH CARE FACILITY: Primary | ICD-10-CM

## 2019-07-02 NOTE — TELEPHONE ENCOUNTER
Patient called to ask if there is any way you could get her labslip ready for first thing tomorrow (Weds) morning (she is coming into town then). Her appt with Dr. Mian Mcdonald is next Tues. Asked if you would please call to let her know if you are able to do this.

## 2019-07-04 LAB
ALBUMIN SERPL-MCNC: 4.4 G/DL (ref 3.5–5.5)
ALBUMIN/GLOB SERPL: 1.7 {RATIO} (ref 1.2–2.2)
ALP SERPL-CCNC: 104 IU/L (ref 39–117)
ALT SERPL-CCNC: 18 IU/L (ref 0–32)
APPEARANCE UR: CLEAR
AST SERPL-CCNC: 22 IU/L (ref 0–40)
BASOPHILS # BLD AUTO: 0 X10E3/UL (ref 0–0.2)
BASOPHILS NFR BLD AUTO: 1 %
BILIRUB SERPL-MCNC: 0.4 MG/DL (ref 0–1.2)
BILIRUB UR QL STRIP: NEGATIVE
BUN SERPL-MCNC: 8 MG/DL (ref 6–24)
BUN/CREAT SERPL: 9 (ref 9–23)
CALCIUM SERPL-MCNC: 10 MG/DL (ref 8.7–10.2)
CHLORIDE SERPL-SCNC: 103 MMOL/L (ref 96–106)
CHOLEST SERPL-MCNC: 230 MG/DL (ref 100–199)
CO2 SERPL-SCNC: 24 MMOL/L (ref 20–29)
COLOR UR: YELLOW
CREAT SERPL-MCNC: 0.9 MG/DL (ref 0.57–1)
EOSINOPHIL # BLD AUTO: 0.2 X10E3/UL (ref 0–0.4)
EOSINOPHIL NFR BLD AUTO: 3 %
ERYTHROCYTE [DISTWIDTH] IN BLOOD BY AUTOMATED COUNT: 12.7 % (ref 12.3–15.4)
GLOBULIN SER CALC-MCNC: 2.6 G/DL (ref 1.5–4.5)
GLUCOSE SERPL-MCNC: 90 MG/DL (ref 65–99)
GLUCOSE UR QL: NEGATIVE
HCT VFR BLD AUTO: 42.9 % (ref 34–46.6)
HDLC SERPL-MCNC: 46 MG/DL
HGB BLD-MCNC: 14.5 G/DL (ref 11.1–15.9)
HGB UR QL STRIP: NEGATIVE
IMM GRANULOCYTES # BLD AUTO: 0 X10E3/UL (ref 0–0.1)
IMM GRANULOCYTES NFR BLD AUTO: 0 %
KETONES UR QL STRIP: NEGATIVE
LDLC SERPL CALC-MCNC: 160 MG/DL (ref 0–99)
LEUKOCYTE ESTERASE UR QL STRIP: NEGATIVE
LYMPHOCYTES # BLD AUTO: 1.3 X10E3/UL (ref 0.7–3.1)
LYMPHOCYTES NFR BLD AUTO: 22 %
MCH RBC QN AUTO: 31.1 PG (ref 26.6–33)
MCHC RBC AUTO-ENTMCNC: 33.8 G/DL (ref 31.5–35.7)
MCV RBC AUTO: 92 FL (ref 79–97)
MICRO URNS: NORMAL
MONOCYTES # BLD AUTO: 0.5 X10E3/UL (ref 0.1–0.9)
MONOCYTES NFR BLD AUTO: 9 %
NEUTROPHILS # BLD AUTO: 3.8 X10E3/UL (ref 1.4–7)
NEUTROPHILS NFR BLD AUTO: 65 %
NITRITE UR QL STRIP: NEGATIVE
PH UR STRIP: 6.5 [PH] (ref 5–7.5)
PLATELET # BLD AUTO: 343 X10E3/UL (ref 150–450)
POTASSIUM SERPL-SCNC: 4.7 MMOL/L (ref 3.5–5.2)
PROT SERPL-MCNC: 7 G/DL (ref 6–8.5)
PROT UR QL STRIP: NEGATIVE
RBC # BLD AUTO: 4.66 X10E6/UL (ref 3.77–5.28)
SODIUM SERPL-SCNC: 142 MMOL/L (ref 134–144)
SP GR UR: 1.02 (ref 1–1.03)
TRIGL SERPL-MCNC: 121 MG/DL (ref 0–149)
TSH SERPL DL<=0.005 MIU/L-ACNC: 1.51 UIU/ML (ref 0.45–4.5)
UROBILINOGEN UR STRIP-MCNC: 0.2 MG/DL (ref 0.2–1)
VLDLC SERPL CALC-MCNC: 24 MG/DL (ref 5–40)
WBC # BLD AUTO: 5.9 X10E3/UL (ref 3.4–10.8)

## 2019-07-09 ENCOUNTER — OFFICE VISIT (OUTPATIENT)
Dept: INTERNAL MEDICINE CLINIC | Age: 49
End: 2019-07-09

## 2019-07-09 VITALS
RESPIRATION RATE: 16 BRPM | OXYGEN SATURATION: 97 % | HEIGHT: 63 IN | WEIGHT: 169.4 LBS | DIASTOLIC BLOOD PRESSURE: 70 MMHG | SYSTOLIC BLOOD PRESSURE: 108 MMHG | TEMPERATURE: 98 F | HEART RATE: 88 BPM | BODY MASS INDEX: 30.02 KG/M2

## 2019-07-09 DIAGNOSIS — R22.43 LOCALIZED SWELLING OF BOTH LOWER LEGS: ICD-10-CM

## 2019-07-09 DIAGNOSIS — Z23 ENCOUNTER FOR IMMUNIZATION: ICD-10-CM

## 2019-07-09 DIAGNOSIS — Z12.39 BREAST CANCER SCREENING: ICD-10-CM

## 2019-07-09 DIAGNOSIS — M25.562 CHRONIC PAIN OF LEFT KNEE: ICD-10-CM

## 2019-07-09 DIAGNOSIS — G89.29 CHRONIC PAIN OF LEFT KNEE: ICD-10-CM

## 2019-07-09 DIAGNOSIS — M35.00 SJOGREN'S SYNDROME WITHOUT EXTRAGLANDULAR INVOLVEMENT (HCC): ICD-10-CM

## 2019-07-09 DIAGNOSIS — Z12.11 SCREEN FOR COLON CANCER: ICD-10-CM

## 2019-07-09 DIAGNOSIS — E78.49 OTHER HYPERLIPIDEMIA: ICD-10-CM

## 2019-07-09 DIAGNOSIS — M25.50 ARTHRALGIA, UNSPECIFIED JOINT: Primary | ICD-10-CM

## 2019-07-09 NOTE — PROGRESS NOTES
Identified pt with two pt identifiers(name and ). Reviewed record in preparation for visit and have obtained necessary documentation. All patient medications has been reviewed. Chief Complaint   Patient presents with    Complete Physical    Labs       Health Maintenance Due   Topic    COLONOSCOPY     BREAST CANCER SCRN MAMMOGRAM     PAP AKA CERVICAL CYTOLOGY     Shingrix Vaccine Age 50> (1 of 2)     Mammo: not done     Pap smear: done last year     Fobt: not done     Vitals:    19 1400   BP: 108/70   Pulse: 88   Resp: 16   Temp: 98 °F (36.7 °C)   TempSrc: Oral   SpO2: 97%   Weight: 169 lb 6.4 oz (76.8 kg)   Height: 5' 2.6\" (1.59 m)   PainSc:   0 - No pain       Coordination of Care Questionnaire:   1) Have you been to an emergency room, urgent care, or hospitalized since your last visit?   no       2. Have seen or consulted any other health care provider since your last visit? NO    3) Do you have an Advanced Directive/ Living Will in place? NO  If yes, do we have a copy on file NO  If no, would you like information YES    Patient is accompanied by self I have received verbal consent from Shreya Culver to discuss any/all medical information while they are present in the room.

## 2019-07-09 NOTE — PROGRESS NOTES
HISTORY OF PRESENT ILLNESS  Oumou Romero is a 44 y.o. female. OLIVE Morales is seen today for follow-up of chronic problems and to have a physical.      1. Preventive medicine. She is due for colonoscopy, shingles vaccine, TDAP booster and a mammogram.  She is up to date with labs, which I fully reviewed with her and a GYN exam.    2. Chronic problems are reviewed. She was diagnosed with Sjogren's syndrome. Treatment is supportive. She follows up with rheumatologist as directed. Peripheral edema has improved and she is taking medication p.r.n. only. She has some left knee pain following an injury. She has some clicking and is worried   about meniscus injury. Names are provided for orthopedic consultation. Lastly, for cholesterol, she is trying to work on diet and exercise. We counseled regarding healthy lifestyle issues including diet, exercise and stress management. Family history, social history, etc. Are reviewed and updated, see electronic record. Review of Systems   Constitutional: Positive for malaise/fatigue. Negative for weight loss. Respiratory: Positive for shortness of breath. Deconditioning    Cardiovascular: Negative for chest pain, palpitations, leg swelling and PND. Gastrointestinal: Negative. Genitourinary: Negative. Musculoskeletal: Positive for joint pain. Negative for myalgias. Neurological: Negative for focal weakness. Physical Exam   Constitutional: She is oriented to person, place, and time. She appears well-developed and well-nourished. No distress. HENT:   Head: Normocephalic and atraumatic. Right Ear: Tympanic membrane, external ear and ear canal normal.   Left Ear: Tympanic membrane, external ear and ear canal normal.   Eyes: Pupils are equal, round, and reactive to light. EOM are normal. Right eye exhibits no discharge. Left eye exhibits no discharge. Neck: Normal range of motion. Neck supple. Carotid bruit is not present.  No thyromegaly present. Cardiovascular: Normal rate, regular rhythm, normal heart sounds and intact distal pulses. Exam reveals no gallop and no friction rub. No murmur heard. Pulmonary/Chest: Effort normal and breath sounds normal. No respiratory distress. She has no wheezes. She has no rales. Abdominal: Soft. Bowel sounds are normal. She exhibits no distension and no mass. There is no tenderness. There is no rebound and no guarding. Musculoskeletal: Normal range of motion. She exhibits no edema or tenderness. Lymphadenopathy:     She has no cervical adenopathy. Neurological: She is alert and oriented to person, place, and time. She has normal reflexes. Skin: Skin is warm and dry. No rash noted. Psychiatric: She has a normal mood and affect. Her behavior is normal.   Nursing note and vitals reviewed. ASSESSMENT and PLAN  Diagnoses and all orders for this visit:    1. Arthralgia, unspecified joint  - See rheumatologist as directed     2. Localized swelling of both lower legs- Continue current regimen of prescription and / or OTC medications     3. Other hyperlipidemia- Diet and exercise     4. Screen for colon cancer- See gastroenterologist as directed     5. Encounter for immunization  -     TETANUS, DIPHTHERIA TOXOIDS AND ACELLULAR PERTUSSIS VACCINE (TDAP), IN INDIVIDS. >=7, IM  -     varicella-zoster recombinant, PF, (SHINGRIX) 50 mcg/0.5 mL susr injection; 0.5 mL IM once now and then repeat in 2-6 months    6. Breast cancer screening  -     [facility] MAMMO BI SCREENING INCL CAD; Future    7.  Chronic pain of left knee  -     REFERRAL TO ORTHOPEDIC SURGERY

## 2020-07-13 ENCOUNTER — OFFICE VISIT (OUTPATIENT)
Dept: INTERNAL MEDICINE CLINIC | Age: 50
End: 2020-07-13

## 2020-07-13 VITALS
HEIGHT: 62 IN | BODY MASS INDEX: 28.12 KG/M2 | RESPIRATION RATE: 20 BRPM | SYSTOLIC BLOOD PRESSURE: 118 MMHG | HEART RATE: 70 BPM | DIASTOLIC BLOOD PRESSURE: 83 MMHG | TEMPERATURE: 97.6 F | OXYGEN SATURATION: 97 % | WEIGHT: 152.8 LBS

## 2020-07-13 DIAGNOSIS — L60.9 NAIL PROBLEM: ICD-10-CM

## 2020-07-13 DIAGNOSIS — M79.645 CHRONIC PAIN OF LEFT THUMB: ICD-10-CM

## 2020-07-13 DIAGNOSIS — Z00.00 ROUTINE GENERAL MEDICAL EXAMINATION AT A HEALTH CARE FACILITY: Primary | ICD-10-CM

## 2020-07-13 DIAGNOSIS — G89.29 CHRONIC PAIN OF LEFT THUMB: ICD-10-CM

## 2020-07-13 RX ORDER — VALACYCLOVIR HYDROCHLORIDE 1 G/1
TABLET, FILM COATED ORAL
Qty: 12 TAB | Refills: 0 | Status: SHIPPED | OUTPATIENT
Start: 2020-07-13 | End: 2022-05-27 | Stop reason: SDUPTHER

## 2020-07-13 NOTE — PROGRESS NOTES
HISTORY OF PRESENT ILLNESS  Lisa Bernardo is a 44 y.o. female. HPI Subjective:  Antonietta Cabot is seen today for a complete physical exam, as well as follow up of other concerns. 1. Preventive medicine. She is due for labs, shingles vaccine. She is up to date with colonoscopy, pap smear, TDAP booster, GYN care and mammogram.    2. Other concerns. She wonders if she might be anemic. She started herself on iron. She has had some weight loss, unintentional.      She gets occasional swelling of the lower extremities. She has improved stress symptoms without having to work. Sjogren's disease is stable and she follows up with rheumatology as directed. She has had some low back pain and neck tightness. Reviewed stretching exercises, but if persistent, we will proceed with PT. She has left thumb pain consistent with some arthritis. It is not terribly bothersome now, but she will let us know if orthopedic consultation is needed. We counseled regarding healthy lifestyle issues including diet, exercise and stress management. Family history, social history, etc. Are reviewed and updated, see electronic record. Review of Systems   Constitutional: Positive for weight loss. Negative for chills and fever. Respiratory: Negative. Cardiovascular: Negative for chest pain, palpitations, leg swelling and PND. Gastrointestinal: Negative for blood in stool and melena. Musculoskeletal: Positive for back pain, joint pain and neck pain. Negative for myalgias. Neurological: Negative for focal weakness. Psychiatric/Behavioral: The patient is not nervous/anxious. Physical Exam  Vitals signs and nursing note reviewed. Constitutional:       General: She is not in acute distress. Appearance: She is well-developed. HENT:      Head: Normocephalic and atraumatic.       Right Ear: Tympanic membrane, ear canal and external ear normal.      Left Ear: Tympanic membrane, ear canal and external ear normal.   Eyes:      General:         Right eye: No discharge. Left eye: No discharge. Pupils: Pupils are equal, round, and reactive to light. Neck:      Musculoskeletal: Normal range of motion and neck supple. Thyroid: No thyromegaly. Vascular: No carotid bruit. Cardiovascular:      Rate and Rhythm: Normal rate and regular rhythm. Heart sounds: Normal heart sounds. No murmur. No friction rub. No gallop. Pulmonary:      Effort: Pulmonary effort is normal. No respiratory distress. Breath sounds: Normal breath sounds. No wheezing or rales. Abdominal:      General: Bowel sounds are normal. There is no distension. Palpations: Abdomen is soft. There is no mass. Tenderness: There is no abdominal tenderness. There is no guarding or rebound. Musculoskeletal: Normal range of motion. General: No tenderness. Lymphadenopathy:      Cervical: No cervical adenopathy. Skin:     General: Skin is warm and dry. Findings: No rash. Neurological:      Mental Status: She is alert and oriented to person, place, and time. Deep Tendon Reflexes: Reflexes are normal and symmetric. Psychiatric:         Behavior: Behavior normal.         ASSESSMENT and PLAN  Diagnoses and all orders for this visit:    1. Routine general medical examination at a health care facility  -     CBC WITH AUTOMATED DIFF  -     METABOLIC PANEL, COMPREHENSIVE  -     LIPID PANEL  -     TSH 3RD GENERATION  -     URINALYSIS W/ RFLX MICROSCOPIC    2.  Chronic pain of left thumb  -     REFERRAL TO ORTHOPEDIC SURGERY    3. Nail problem  -     IRON PROFILE

## 2020-07-21 ENCOUNTER — TELEPHONE (OUTPATIENT)
Dept: INTERNAL MEDICINE CLINIC | Age: 50
End: 2020-07-21

## 2020-07-21 NOTE — TELEPHONE ENCOUNTER
Called Dr ANYA Crum's office - spoke with Elisabeth Matthews. Requested pt's most recent pap and mammo. She will fax today.

## 2020-07-21 NOTE — TELEPHONE ENCOUNTER
----- Message from Patricia Lovell MD sent at 7/19/2020  1:29 PM EDT -----  Regarding: acs  Call her gyn Dr Ana Rodrigez for latest pap and mammo reports

## 2020-08-02 PROBLEM — F34.0 CYCLOTHYMIA: Status: ACTIVE | Noted: 2020-08-02

## 2020-08-12 LAB
ALBUMIN SERPL-MCNC: 4.6 G/DL (ref 3.8–4.9)
ALBUMIN/GLOB SERPL: 2 {RATIO} (ref 1.2–2.2)
ALP SERPL-CCNC: 74 IU/L (ref 39–117)
ALT SERPL-CCNC: 26 IU/L (ref 0–32)
APPEARANCE UR: ABNORMAL
AST SERPL-CCNC: 42 IU/L (ref 0–40)
BACTERIA #/AREA URNS HPF: NORMAL /[HPF]
BASOPHILS # BLD AUTO: 0 X10E3/UL (ref 0–0.2)
BASOPHILS NFR BLD AUTO: 1 %
BILIRUB SERPL-MCNC: 0.3 MG/DL (ref 0–1.2)
BILIRUB UR QL STRIP: NEGATIVE
BUN SERPL-MCNC: 11 MG/DL (ref 6–24)
BUN/CREAT SERPL: 11 (ref 9–23)
CALCIUM SERPL-MCNC: 10 MG/DL (ref 8.7–10.2)
CASTS URNS QL MICRO: NORMAL /LPF
CHLORIDE SERPL-SCNC: 104 MMOL/L (ref 96–106)
CHOLEST SERPL-MCNC: 253 MG/DL (ref 100–199)
CO2 SERPL-SCNC: 25 MMOL/L (ref 20–29)
COLOR UR: YELLOW
CREAT SERPL-MCNC: 0.96 MG/DL (ref 0.57–1)
EOSINOPHIL # BLD AUTO: 0.2 X10E3/UL (ref 0–0.4)
EOSINOPHIL NFR BLD AUTO: 4 %
EPI CELLS #/AREA URNS HPF: NORMAL /HPF (ref 0–10)
ERYTHROCYTE [DISTWIDTH] IN BLOOD BY AUTOMATED COUNT: 12.7 % (ref 11.7–15.4)
GLOBULIN SER CALC-MCNC: 2.3 G/DL (ref 1.5–4.5)
GLUCOSE SERPL-MCNC: 97 MG/DL (ref 65–99)
GLUCOSE UR QL: NEGATIVE
HCT VFR BLD AUTO: 43.3 % (ref 34–46.6)
HDLC SERPL-MCNC: 55 MG/DL
HGB BLD-MCNC: 14.8 G/DL (ref 11.1–15.9)
HGB UR QL STRIP: ABNORMAL
IMM GRANULOCYTES # BLD AUTO: 0 X10E3/UL (ref 0–0.1)
IMM GRANULOCYTES NFR BLD AUTO: 0 %
IRON SATN MFR SERPL: 28 % (ref 15–55)
IRON SERPL-MCNC: 83 UG/DL (ref 27–159)
KETONES UR QL STRIP: NEGATIVE
LDLC SERPL CALC-MCNC: 178 MG/DL (ref 0–99)
LEUKOCYTE ESTERASE UR QL STRIP: NEGATIVE
LYMPHOCYTES # BLD AUTO: 1 X10E3/UL (ref 0.7–3.1)
LYMPHOCYTES NFR BLD AUTO: 21 %
MCH RBC QN AUTO: 30.8 PG (ref 26.6–33)
MCHC RBC AUTO-ENTMCNC: 34.2 G/DL (ref 31.5–35.7)
MCV RBC AUTO: 90 FL (ref 79–97)
MICRO URNS: ABNORMAL
MONOCYTES # BLD AUTO: 0.4 X10E3/UL (ref 0.1–0.9)
MONOCYTES NFR BLD AUTO: 7 %
NEUTROPHILS # BLD AUTO: 3.3 X10E3/UL (ref 1.4–7)
NEUTROPHILS NFR BLD AUTO: 67 %
NITRITE UR QL STRIP: NEGATIVE
PH UR STRIP: 5.5 [PH] (ref 5–7.5)
PLATELET # BLD AUTO: 278 X10E3/UL (ref 150–450)
POTASSIUM SERPL-SCNC: 4.6 MMOL/L (ref 3.5–5.2)
PROT SERPL-MCNC: 6.9 G/DL (ref 6–8.5)
PROT UR QL STRIP: ABNORMAL
RBC # BLD AUTO: 4.8 X10E6/UL (ref 3.77–5.28)
RBC #/AREA URNS HPF: NORMAL /HPF (ref 0–2)
SODIUM SERPL-SCNC: 143 MMOL/L (ref 134–144)
SP GR UR: >=1.03 (ref 1–1.03)
TIBC SERPL-MCNC: 296 UG/DL (ref 250–450)
TRIGL SERPL-MCNC: 102 MG/DL (ref 0–149)
TSH SERPL DL<=0.005 MIU/L-ACNC: 2.72 UIU/ML (ref 0.45–4.5)
UIBC SERPL-MCNC: 213 UG/DL (ref 131–425)
UROBILINOGEN UR STRIP-MCNC: 0.2 MG/DL (ref 0.2–1)
VLDLC SERPL CALC-MCNC: 20 MG/DL (ref 5–40)
WBC # BLD AUTO: 4.9 X10E3/UL (ref 3.4–10.8)
WBC #/AREA URNS HPF: NORMAL /HPF (ref 0–5)

## 2020-08-17 DIAGNOSIS — R80.0 ISOLATED PROTEINURIA WITHOUT SPECIFIC MORPHOLOGIC LESION: Primary | ICD-10-CM

## 2020-09-03 LAB
ALBUMIN/CREAT UR: 4 MG/G CREAT (ref 0–29)
CREAT UR-MCNC: 210.2 MG/DL
MICROALBUMIN UR-MCNC: 8.7 UG/ML

## 2021-07-12 DIAGNOSIS — R73.09 OTHER ABNORMAL GLUCOSE: ICD-10-CM

## 2021-07-12 DIAGNOSIS — Z00.00 ROUTINE GENERAL MEDICAL EXAMINATION AT A HEALTH CARE FACILITY: Primary | ICD-10-CM

## 2021-07-17 LAB
ALBUMIN SERPL-MCNC: 4.4 G/DL (ref 3.8–4.9)
ALBUMIN/GLOB SERPL: 1.8 {RATIO} (ref 1.2–2.2)
ALP SERPL-CCNC: 82 IU/L (ref 48–121)
ALT SERPL-CCNC: 15 IU/L (ref 0–32)
APPEARANCE UR: CLEAR
AST SERPL-CCNC: 27 IU/L (ref 0–40)
BACTERIA #/AREA URNS HPF: NORMAL /[HPF]
BASOPHILS # BLD AUTO: 0 X10E3/UL (ref 0–0.2)
BASOPHILS NFR BLD AUTO: 1 %
BILIRUB SERPL-MCNC: 0.3 MG/DL (ref 0–1.2)
BILIRUB UR QL STRIP: NEGATIVE
BUN SERPL-MCNC: 8 MG/DL (ref 6–24)
BUN/CREAT SERPL: 9 (ref 9–23)
CALCIUM SERPL-MCNC: 9.5 MG/DL (ref 8.7–10.2)
CASTS URNS QL MICRO: NORMAL /LPF
CHLORIDE SERPL-SCNC: 105 MMOL/L (ref 96–106)
CHOLEST SERPL-MCNC: 231 MG/DL (ref 100–199)
CO2 SERPL-SCNC: 25 MMOL/L (ref 20–29)
COLOR UR: YELLOW
CREAT SERPL-MCNC: 0.94 MG/DL (ref 0.57–1)
EOSINOPHIL # BLD AUTO: 0.2 X10E3/UL (ref 0–0.4)
EOSINOPHIL NFR BLD AUTO: 4 %
EPI CELLS #/AREA URNS HPF: NORMAL /HPF (ref 0–10)
ERYTHROCYTE [DISTWIDTH] IN BLOOD BY AUTOMATED COUNT: 12.6 % (ref 11.7–15.4)
EST. AVERAGE GLUCOSE BLD GHB EST-MCNC: 126 MG/DL
GLOBULIN SER CALC-MCNC: 2.5 G/DL (ref 1.5–4.5)
GLUCOSE SERPL-MCNC: 94 MG/DL (ref 65–99)
GLUCOSE UR QL: NEGATIVE
HBA1C MFR BLD: 6 % (ref 4.8–5.6)
HCT VFR BLD AUTO: 42.6 % (ref 34–46.6)
HDLC SERPL-MCNC: 52 MG/DL
HGB BLD-MCNC: 14.1 G/DL (ref 11.1–15.9)
HGB UR QL STRIP: NEGATIVE
IMM GRANULOCYTES # BLD AUTO: 0 X10E3/UL (ref 0–0.1)
IMM GRANULOCYTES NFR BLD AUTO: 0 %
KETONES UR QL STRIP: NEGATIVE
LDLC SERPL CALC-MCNC: 168 MG/DL (ref 0–99)
LEUKOCYTE ESTERASE UR QL STRIP: ABNORMAL
LYMPHOCYTES # BLD AUTO: 1.1 X10E3/UL (ref 0.7–3.1)
LYMPHOCYTES NFR BLD AUTO: 22 %
MCH RBC QN AUTO: 30.4 PG (ref 26.6–33)
MCHC RBC AUTO-ENTMCNC: 33.1 G/DL (ref 31.5–35.7)
MCV RBC AUTO: 92 FL (ref 79–97)
MICRO URNS: ABNORMAL
MONOCYTES # BLD AUTO: 0.3 X10E3/UL (ref 0.1–0.9)
MONOCYTES NFR BLD AUTO: 7 %
NEUTROPHILS # BLD AUTO: 3.2 X10E3/UL (ref 1.4–7)
NEUTROPHILS NFR BLD AUTO: 66 %
NITRITE UR QL STRIP: NEGATIVE
PH UR STRIP: 6.5 [PH] (ref 5–7.5)
PLATELET # BLD AUTO: 267 X10E3/UL (ref 150–450)
POTASSIUM SERPL-SCNC: 4.4 MMOL/L (ref 3.5–5.2)
PROT SERPL-MCNC: 6.9 G/DL (ref 6–8.5)
PROT UR QL STRIP: NEGATIVE
RBC # BLD AUTO: 4.64 X10E6/UL (ref 3.77–5.28)
RBC #/AREA URNS HPF: NORMAL /HPF (ref 0–2)
SODIUM SERPL-SCNC: 142 MMOL/L (ref 134–144)
SP GR UR: 1.01 (ref 1–1.03)
THYROPEROXIDASE AB SERPL-ACNC: <8 IU/ML (ref 0–34)
TRIGL SERPL-MCNC: 67 MG/DL (ref 0–149)
UROBILINOGEN UR STRIP-MCNC: 0.2 MG/DL (ref 0.2–1)
VLDLC SERPL CALC-MCNC: 11 MG/DL (ref 5–40)
WBC # BLD AUTO: 4.8 X10E3/UL (ref 3.4–10.8)
WBC #/AREA URNS HPF: NORMAL /HPF (ref 0–5)

## 2021-08-05 ENCOUNTER — OFFICE VISIT (OUTPATIENT)
Dept: INTERNAL MEDICINE CLINIC | Age: 51
End: 2021-08-05
Payer: COMMERCIAL

## 2021-08-05 VITALS
BODY MASS INDEX: 27.97 KG/M2 | RESPIRATION RATE: 20 BRPM | SYSTOLIC BLOOD PRESSURE: 133 MMHG | WEIGHT: 152 LBS | TEMPERATURE: 97.8 F | DIASTOLIC BLOOD PRESSURE: 87 MMHG | HEIGHT: 62 IN | OXYGEN SATURATION: 97 % | HEART RATE: 79 BPM

## 2021-08-05 DIAGNOSIS — Z00.00 ROUTINE GENERAL MEDICAL EXAMINATION AT A HEALTH CARE FACILITY: Primary | ICD-10-CM

## 2021-08-05 DIAGNOSIS — M35.00 SJOGREN'S SYNDROME WITHOUT EXTRAGLANDULAR INVOLVEMENT (HCC): ICD-10-CM

## 2021-08-05 PROCEDURE — 99396 PREV VISIT EST AGE 40-64: CPT | Performed by: INTERNAL MEDICINE

## 2021-08-05 NOTE — PROGRESS NOTES
HISTORY OF PRESENT ILLNESS  Sarah Doe is a 46 y.o. female. HPI  Assessment: Veda Bedolla is seen today for a complete exam and review of other concerns. Preventive Medicine: She is due for the shingles vaccine but she plans on holding off on this for now. She is up to date with labs which I fully reviewed with her, colonoscopy, and gyn care. Her chronic problems are stable. She feels she is managing with her connective tissue disease symptoms. She follows up with psychiatry. We counseled regarding healthy lifestyle issues including diet, exercise and stress management. Family history, social history, etc. Are reviewed and updated, see electronic record. Review of Systems   Constitutional: Negative for weight loss. HENT: Positive for congestion. Respiratory: Negative. Cardiovascular: Negative for chest pain, palpitations, leg swelling and PND. Gastrointestinal: Positive for abdominal pain. Occasional LLQ pain   Genitourinary: Negative. Musculoskeletal: Positive for joint pain. Negative for myalgias. R first MCP , l thumb   Neurological: Positive for headaches. Negative for focal weakness. Physical Exam  Vitals and nursing note reviewed. Constitutional:       General: She is not in acute distress. Appearance: She is well-developed. HENT:      Head: Normocephalic and atraumatic. Right Ear: Tympanic membrane, ear canal and external ear normal.      Left Ear: Tympanic membrane, ear canal and external ear normal.   Eyes:      General:         Right eye: No discharge. Left eye: No discharge. Pupils: Pupils are equal, round, and reactive to light. Neck:      Thyroid: No thyromegaly. Vascular: No carotid bruit. Cardiovascular:      Rate and Rhythm: Normal rate and regular rhythm. Heart sounds: Normal heart sounds. No murmur heard. No friction rub. No gallop. Pulmonary:      Effort: Pulmonary effort is normal. No respiratory distress. Breath sounds: Normal breath sounds. No wheezing or rales. Abdominal:      General: Bowel sounds are normal. There is no distension. Palpations: Abdomen is soft. There is no mass. Tenderness: There is no abdominal tenderness. There is no guarding or rebound. Musculoskeletal:         General: No tenderness. Normal range of motion. Cervical back: Normal range of motion and neck supple. Lymphadenopathy:      Cervical: No cervical adenopathy. Skin:     General: Skin is warm and dry. Findings: No rash. Neurological:      Mental Status: She is alert and oriented to person, place, and time. Deep Tendon Reflexes: Reflexes are normal and symmetric. Psychiatric:         Behavior: Behavior normal.         ASSESSMENT and PLAN  Diagnoses and all orders for this visit:    1. Routine general medical examination at a health care facility    2.  Sjogren's syndrome without extraglandular involvement ([facility] [address]

## 2022-03-19 PROBLEM — F34.0 CYCLOTHYMIA: Status: ACTIVE | Noted: 2020-08-02

## 2022-06-29 ENCOUNTER — TELEPHONE (OUTPATIENT)
Dept: INTERNAL MEDICINE CLINIC | Age: 52
End: 2022-06-29

## 2022-06-29 NOTE — TELEPHONE ENCOUNTER
----- Message from [facility] Case [address] sent at 6/29/2022 10:24 AM EDT -----  Subject: Message to Provider    QUESTIONS  Information for Provider? The patient has an appt 8/8/22 with her current   PCP. The patient received a letter in the mail that Dr. Verena Mena is   retiring. The patient is calling to get a recommendation on establishing   care with a new provider in the office. She has a preference to see Dr. Josie Bryan first or she is willing to be flexible with anyone in the practice   taking over for Dr. Verena Mena. Please call the pt back to advise.  ---------------------------------------------------------------------------  --------------  CALL BACK INFO  What is the best way for the office to contact you? OK to leave message on   voicemail  Preferred Call Back Phone Number? [phone]  ---------------------------------------------------------------------------  --------------  SCRIPT ANSWERS  Relationship to Patient?  Self

## 2022-08-08 ENCOUNTER — TELEPHONE (OUTPATIENT)
Dept: INTERNAL MEDICINE CLINIC | Age: 52
End: 2022-08-08

## 2022-08-08 ENCOUNTER — OFFICE VISIT (OUTPATIENT)
Dept: INTERNAL MEDICINE CLINIC | Age: 52
End: 2022-08-08
Payer: COMMERCIAL

## 2022-08-08 VITALS
WEIGHT: 156.4 LBS | HEIGHT: 62 IN | SYSTOLIC BLOOD PRESSURE: 124 MMHG | OXYGEN SATURATION: 97 % | RESPIRATION RATE: 20 BRPM | TEMPERATURE: 98.2 F | DIASTOLIC BLOOD PRESSURE: 81 MMHG | BODY MASS INDEX: 28.78 KG/M2 | HEART RATE: 81 BPM

## 2022-08-08 DIAGNOSIS — Z11.59 ENCOUNTER FOR HEPATITIS C SCREENING TEST FOR LOW RISK PATIENT: ICD-10-CM

## 2022-08-08 DIAGNOSIS — Z00.00 ROUTINE GENERAL MEDICAL EXAMINATION AT A HEALTH CARE FACILITY: Primary | ICD-10-CM

## 2022-08-08 DIAGNOSIS — M35.00 SJOGREN'S SYNDROME WITHOUT EXTRAGLANDULAR INVOLVEMENT (HCC): ICD-10-CM

## 2022-08-08 PROCEDURE — 99396 PREV VISIT EST AGE 40-64: CPT | Performed by: INTERNAL MEDICINE

## 2022-08-08 NOTE — PROGRESS NOTES
HISTORY OF PRESENT ILLNESS  Esperanza Felix is a 45 y.o. female. Physical  Associated symptoms include abdominal pain. Pertinent negatives include no chest pain. Assessment:  Juan David Borges is seen today for a complete exam and follow up of chronic problems. 1. Preventive care. She is due for select lab studies and the shingles vaccine. I reviewed available lab studies from her rheumatologist on her mobile device. She is up to date with GYN care, mammograms, other vaccinations and colonoscopy. Bone density study is not yet indicated due to the fact that she has not completed menopause. Habits reviewed. She vapes. I encouraged decreasing the amount and discontinuing if possible. 2. Chronic problems are reviewed. She has Sjogren's syndrome, but this appears stable currently. She follows up annually with her rheumatologist.      We counseled regarding healthy lifestyle issues including diet, exercise and stress management. Family history, social history, etc. Are reviewed and updated, see electronic record. Review of Systems   Constitutional:  Positive for diaphoresis. Negative for chills, fever and weight loss. Respiratory: Negative. Cardiovascular:  Negative for chest pain, palpitations, leg swelling and PND. Gastrointestinal:  Positive for abdominal pain. Intermittent ibs sx   Genitourinary: Negative. Musculoskeletal:  Negative for myalgias. Neurological:  Negative for focal weakness. Physical Exam  Vitals and nursing note reviewed. Constitutional:       General: She is not in acute distress. Appearance: She is well-developed. HENT:      Head: Normocephalic and atraumatic. Right Ear: Tympanic membrane, ear canal and external ear normal.      Left Ear: Tympanic membrane, ear canal and external ear normal.   Eyes:      General:         Right eye: No discharge. Left eye: No discharge. Pupils: Pupils are equal, round, and reactive to light.    Neck: Thyroid: No thyromegaly. Vascular: No carotid bruit. Cardiovascular:      Rate and Rhythm: Normal rate and regular rhythm. Heart sounds: Normal heart sounds. No murmur heard. No friction rub. No gallop. Pulmonary:      Effort: Pulmonary effort is normal. No respiratory distress. Breath sounds: Normal breath sounds. No wheezing or rales. Abdominal:      General: Bowel sounds are normal. There is no distension. Palpations: Abdomen is soft. There is no mass. Tenderness: There is no abdominal tenderness. There is no guarding or rebound. Musculoskeletal:         General: No tenderness. Normal range of motion. Cervical back: Normal range of motion and neck supple. Lymphadenopathy:      Cervical: No cervical adenopathy. Skin:     General: Skin is warm and dry. Findings: No rash. Neurological:      Mental Status: She is alert and oriented to person, place, and time. Deep Tendon Reflexes: Reflexes are normal and symmetric. Psychiatric:         Behavior: Behavior normal.       ASSESSMENT and PLAN  Diagnoses and all orders for this visit:    1. Routine general medical examination at a health care facility  -     LIPID PANEL; Future  -     TSH 3RD GENERATION; Future  -     URINALYSIS W/ RFLX MICROSCOPIC; Future  -     METABOLIC PANEL, BASIC; Future    2. Encounter for hepatitis C screening test for low risk patient  -     HEPATITIS C AB; Future    3.  Sjogren's syndrome without extraglandular involvement ([facility] [address]

## 2022-08-09 LAB
APPEARANCE UR: CLEAR
BILIRUB UR QL STRIP: NEGATIVE
BUN SERPL-MCNC: 7 MG/DL (ref 6–24)
BUN/CREAT SERPL: 8 (ref 9–23)
CALCIUM SERPL-MCNC: 9.5 MG/DL (ref 8.7–10.2)
CHLORIDE SERPL-SCNC: 105 MMOL/L (ref 96–106)
CHOLEST SERPL-MCNC: 264 MG/DL (ref 100–199)
CO2 SERPL-SCNC: 24 MMOL/L (ref 20–29)
COLOR UR: YELLOW
CREAT SERPL-MCNC: 0.83 MG/DL (ref 0.57–1)
EGFR: 84 ML/MIN/1.73
GLUCOSE SERPL-MCNC: 90 MG/DL (ref 65–99)
GLUCOSE UR QL STRIP: NEGATIVE
HCV AB S/CO SERPL IA: <0.1 S/CO RATIO (ref 0–0.9)
HDLC SERPL-MCNC: 57 MG/DL
HGB UR QL STRIP: NEGATIVE
KETONES UR QL STRIP: NEGATIVE
LDLC SERPL CALC-MCNC: 189 MG/DL (ref 0–99)
LEUKOCYTE ESTERASE UR QL STRIP: NEGATIVE
MICRO URNS: NORMAL
NITRITE UR QL STRIP: NEGATIVE
PH UR STRIP: 6.5 [PH] (ref 5–7.5)
POTASSIUM SERPL-SCNC: 4.5 MMOL/L (ref 3.5–5.2)
PROT UR QL STRIP: NEGATIVE
SODIUM SERPL-SCNC: 144 MMOL/L (ref 134–144)
SP GR UR STRIP: 1.01 (ref 1–1.03)
TRIGL SERPL-MCNC: 101 MG/DL (ref 0–149)
TSH SERPL DL<=0.005 MIU/L-ACNC: 1.35 UIU/ML (ref 0.45–4.5)
UROBILINOGEN UR STRIP-MCNC: 0.2 MG/DL (ref 0.2–1)
VLDLC SERPL CALC-MCNC: 18 MG/DL (ref 5–40)

## 2023-01-05 ENCOUNTER — OFFICE VISIT (OUTPATIENT)
Dept: INTERNAL MEDICINE CLINIC | Age: 53
End: 2023-01-05
Payer: COMMERCIAL

## 2023-01-05 VITALS
HEIGHT: 62 IN | SYSTOLIC BLOOD PRESSURE: 144 MMHG | OXYGEN SATURATION: 98 % | DIASTOLIC BLOOD PRESSURE: 91 MMHG | TEMPERATURE: 97.9 F | WEIGHT: 158 LBS | BODY MASS INDEX: 29.08 KG/M2 | HEART RATE: 84 BPM | RESPIRATION RATE: 18 BRPM

## 2023-01-05 DIAGNOSIS — F34.0 CYCLOTHYMIA: ICD-10-CM

## 2023-01-05 DIAGNOSIS — M35.00 SJOGREN'S SYNDROME, WITH UNSPECIFIED ORGAN INVOLVEMENT (HCC): ICD-10-CM

## 2023-01-05 DIAGNOSIS — R03.0 ELEVATED BLOOD PRESSURE READING IN OFFICE WITHOUT DIAGNOSIS OF HYPERTENSION: ICD-10-CM

## 2023-01-05 DIAGNOSIS — E78.49 OTHER HYPERLIPIDEMIA: ICD-10-CM

## 2023-01-05 PROBLEM — F41.1 GENERALIZED ANXIETY DISORDER: Status: ACTIVE | Noted: 2023-01-05

## 2023-01-05 PROCEDURE — 99214 OFFICE O/P EST MOD 30 MIN: CPT | Performed by: INTERNAL MEDICINE

## 2023-01-05 RX ORDER — ADAPALENE AND BENZOYL PEROXIDE .1; 2.5 G/100G; G/100G
GEL TOPICAL
COMMUNITY
Start: 2022-11-14 | End: 2023-01-05

## 2023-01-05 NOTE — ASSESSMENT & PLAN NOTE
Rheum Dr Chaparro Angel  Sometimes manifested as lymphadenopathy, leg edema, dry eye, uveitis  Symptoms have been overall quiescent so not on therapies, monitors annually with rheum

## 2023-01-05 NOTE — ASSESSMENT & PLAN NOTE
Lab Results   Component Value Date/Time    LDL, calculated 189 (H) 08/08/2022 11:54 AM    LDL, calculated 178 (H) 08/11/2020 12:00 AM     The 10-year ASCVD risk score (Minh LABOY, et al., 2019) is: 3%  Continues lifestyle management

## 2023-01-05 NOTE — ASSESSMENT & PLAN NOTE
Discussed getting upper arm meter  Advised on home monitoring of BP and keep a log  Update me with readings via [employer] in 2-3 weeks

## 2023-01-05 NOTE — ASSESSMENT & PLAN NOTE
Managed per Dr Maximo Welch  Discussed option to look into adding tele-therapy as a way to help with stress management techniques and anxiety seems to be feeding into her elevated blood pressure

## 2023-01-05 NOTE — PROGRESS NOTES
1/5/2023    Giselle Madsen 1970 is a 45y.o. year old female established patient,   here for evaluation of the following chief complaint(s):  Chief Complaint   Patient presents with    Establish Care    Blood Pressure Check           ASSESSMENT/PLAN:  Below is the assessment and plan developed based on review of pertinent history, physical exam, labs, studies, and medications. 1. Sjogren's syndrome, with unspecified organ involvement [facility])  Assessment & Plan:  Rheum Dr Lavon Paulino  Sometimes manifested as lymphadenopathy, leg edema, dry eye, uveitis  Symptoms have been overall quiescent so not on therapies, monitors annually with rheum  2. Other hyperlipidemia  Assessment & Plan:  Lab Results   Component Value Date/Time    LDL, calculated 189 (H) 08/08/2022 11:54 AM    LDL, calculated 178 (H) 08/11/2020 12:00 AM     The 10-year ASCVD risk score (Minh LABOY, et al., 2019) is: 3%  Continues lifestyle management  3. Elevated blood pressure reading in office without diagnosis of hypertension  Assessment & Plan:   Discussed getting upper arm meter  Advised on home monitoring of BP and keep a log  Update me with readings via [employer] in 2-3 weeks    4. Cyclothymia  Assessment & Plan:   Managed per Dr Tyler Valles  Discussed option to look into adding tele-therapy as a way to help with stress management techniques and anxiety seems to be feeding into her elevated blood pressure       Follow-up and Dispositions    Return follow up pending BP, for annual physical in 6mo. SUBJECTIVE/OBJECTIVE:  New patient to me, here to establish care  Visit spent in discussion of past and current medical conditions as documented in assessment and plan    Recently after feeling stressed at work she felt like some chest tightness, lightheadedness, went to school nurse who measured BP and it was >662 systolic. She has been checking some home BP measures with a wrist monitor, gets 140s/80s.   Wrist monitor seems to be running falsely high on check in office today    She does have anxiety and cyclothymia and follows with Dr Luisana Castaneda. With his permission she does use some medical marijuana that helps her relax       Review of Systems   Constitutional:  Negative for chills and fever. Respiratory:  Negative for cough and shortness of breath. Cardiovascular:  Negative for chest pain, palpitations and leg swelling. Gastrointestinal:  Negative for abdominal pain. Skin:  Negative for rash. Neurological:  Negative for dizziness. Psychiatric/Behavioral:  The patient is nervous/anxious. Physical Exam  Constitutional:       General: She is not in acute distress. Appearance: Normal appearance. She is not ill-appearing. HENT:      Head: Normocephalic and atraumatic. Eyes:      Conjunctiva/sclera: Conjunctivae normal.   Cardiovascular:      Rate and Rhythm: Normal rate and regular rhythm. Heart sounds: No murmur heard. Pulmonary:      Effort: Pulmonary effort is normal.      Breath sounds: Normal breath sounds. No wheezing. Musculoskeletal:      Right lower leg: No edema. Left lower leg: No edema. Skin:     General: Skin is warm and dry. Neurological:      General: No focal deficit present. Mental Status: She is alert and oriented to person, place, and time. Mental status is at baseline. Psychiatric:         Mood and Affect: Mood normal.         Thought Content: Thought content normal.         Judgment: Judgment normal.        Vitals:    01/05/23 1310   BP: (!) 144/91   Pulse: 84   Resp: 18   Temp: 97.9 °F (36.6 °C)   TempSrc: Temporal   SpO2: 98%   Weight: 158 lb (71.7 kg)   Height: 5' 2\" (1.575 m)        The following sections were reviewed & updated as appropriate: Problem List, Allergies, PMH, PSH, FH, and SH.     Patient Active Problem List   Diagnosis Code    Other acne L70.8    Allergic rhinitis, cause unspecified J30.9    Hyperlipemia E78.5    Fibroids D21.9    Uveitis H20.9    Plantar fasciitis M72.2    Vitamin D deficiency E55.9    Other abnormal glucose R73.09    Herpes simplex without mention of complication U20.4    Somatic dysfunction of spine affecting pelvic region M99.05    Cyclothymia F34.0    Sjogren's syndrome (HCC) M35.00    Generalized anxiety disorder F41.1    Elevated blood pressure reading in office without diagnosis of hypertension R03.0        Current Outpatient Medications   Medication Sig Dispense Refill    docosahexaenoic acid/epa (FISH OIL PO) Take  by mouth daily. valACYclovir (VALTREX) 1 gram tablet Take 2 at onset of cold sore, repeat once in 12 hours 12 Tablet 1    estradiol (ESTRACE) 0.5 mg tablet TAKE 1 TABLET BY MOUTH EVERY DAY  12    MAGNESIUM PO Take  by mouth. cyclobenzaprine (FLEXERIL) 10 mg tablet Take 1 Tab by mouth three (3) times daily as needed for Muscle Spasm(s). 30 Tab 2    desonide (TRIDESILON) 0.05 % cream PRN  0    Adapalene-Benzoyl Peroxide 0.1-2.5 % gel by Apply Externally route. acetaminophen (TYLENOL) 325 mg tablet Take  by mouth every four (4) hours as needed. LORazepam (ATIVAN) 0.5 mg tablet Take  by mouth. cholecalciferol (VITAMIN D3) 1,000 unit tablet Take 5 Tabs by mouth daily. Takes as a 5000 unit cap 30 Tab 12    tretinoin (RETIN-A) 0.1 % topical cream       lamoTRIgine (LaMICtal) 150 mg tablet Take  by mouth daily. Aleve [naproxen sodium], Nsaids (non-steroidal anti-inflammatory drug), Pcn [penicillins], and Percocet [oxycodone-acetaminophen]    Social History     Occupational History    Not on file   Tobacco Use    Smoking status: Former     Packs/day: 0.25     Years: 30.00     Pack years: 7.50     Types: Cigarettes     Quit date: 2011     Years since quittin.4    Smokeless tobacco: Current    Tobacco comments:     vape    Vaping Use    Vaping Use: Every day    Substances: Nicotine, THC   Substance and Sexual Activity    Alcohol use:  Yes     Alcohol/week: 7.0 standard drinks     Types: 7 Glasses of wine per week    Drug use: No    Sexual activity: Yes     Partners: Male     Birth control/protection: None        On this date 01/05/2023 I have spent 35 minutes reviewing previous notes, test results and face to face with the patient discussing the diagnosis and importance of compliance with the treatment plan as well as documenting on the day of the visit. Disclaimer:  Aspects of this note may have been generated using Dragon voice recognition software. Despite editing, there may be some syntax errors   We discussed the expected course, resolution and complications of the diagnosis(es) in detail. I have discussed any significant medication side effects and warnings with the patient when indicated. I advised them to contact the office if their condition worsens, changes or fails to improve as anticipated. Patient expressed understanding of the diagnosis and plan. An electronic signature was used to authenticate this note.   -- Malika Humphrey MD

## 2023-03-15 ENCOUNTER — TELEPHONE (OUTPATIENT)
Dept: INTERNAL MEDICINE CLINIC | Age: 53
End: 2023-03-15

## 2023-03-15 NOTE — TELEPHONE ENCOUNTER
----- Message from Ashley Arechiga sent at 3/15/2023 12:05 PM EDT -----  Subject: Message to Provider    QUESTIONS  Information for Provider? Patient refused NT due to dizzy spells from   possible BP spikes in the 150/90s. I advised her that I would have to get   her over to the A/H since it was lunch time and she was not willing to to   that. She just wanted to make an appt. I advised her I cannot do that   until she is evaluated for dizziness. She was very mad, and will call   back. ---------------------------------------------------------------------------  --------------  Justin PETERSON  [phone]; OK to leave message on voicemail  ---------------------------------------------------------------------------  --------------  SCRIPT ANSWERS  Relationship to Patient?  Self

## 2023-03-21 ENCOUNTER — OFFICE VISIT (OUTPATIENT)
Dept: ORTHOPEDIC SURGERY | Age: 53
End: 2023-03-21
Payer: COMMERCIAL

## 2023-03-21 VITALS — HEIGHT: 63 IN | WEIGHT: 155 LBS | BODY MASS INDEX: 27.46 KG/M2

## 2023-03-21 DIAGNOSIS — S83.241A ACUTE MEDIAL MENISCUS TEAR OF RIGHT KNEE, INITIAL ENCOUNTER: ICD-10-CM

## 2023-03-21 DIAGNOSIS — M25.461 EFFUSION OF RIGHT KNEE: ICD-10-CM

## 2023-03-21 DIAGNOSIS — M25.561 RIGHT KNEE PAIN, UNSPECIFIED CHRONICITY: Primary | ICD-10-CM

## 2023-03-21 PROCEDURE — 99203 OFFICE O/P NEW LOW 30 MIN: CPT | Performed by: ORTHOPAEDIC SURGERY

## 2023-03-21 NOTE — PROGRESS NOTES
Marycarmen Antonio (: 1970) is a 45 y.o. female, patient, here for evaluation of the following chief complaint(s):  Knee Pain (Right )       HPI:    20 years ago patient had arthroscopic surgery on her right knee. Chief complaint today is right knee pain. Recent shot of corticosteroids about 10 days ago alleviated a lot of her knee symptoms. She was doing pretty well up until that point but had acute onset of right knee pain. Mostly patella tendon but it is now migrated more to the medial joint. The shot helped significantly but she has several issues and concerns. Allergies   Allergen Reactions    Aleve [Naproxen Sodium] Other (comments)     Oral ulcer    Nsaids (Non-Steroidal Anti-Inflammatory Drug) Other (comments)     Ulcerated mucous membranes    Pcn [Penicillins] Hives    Percocet [Oxycodone-Acetaminophen] Itching       Current Outpatient Medications   Medication Sig    docosahexaenoic acid/epa (FISH OIL PO) Take  by mouth daily. valACYclovir (VALTREX) 1 gram tablet Take 2 at onset of cold sore, repeat once in 12 hours    estradiol (ESTRACE) 0.5 mg tablet TAKE 1 TABLET BY MOUTH EVERY DAY    MAGNESIUM PO Take  by mouth. cyclobenzaprine (FLEXERIL) 10 mg tablet Take 1 Tab by mouth three (3) times daily as needed for Muscle Spasm(s). desonide (TRIDESILON) 0.05 % cream PRN    Adapalene-Benzoyl Peroxide 0.1-2.5 % gel by Apply Externally route. acetaminophen (TYLENOL) 325 mg tablet Take  by mouth every four (4) hours as needed. LORazepam (ATIVAN) 0.5 mg tablet Take  by mouth. cholecalciferol (VITAMIN D3) 1,000 unit tablet Take 5 Tabs by mouth daily. Takes as a 5000 unit cap    tretinoin (RETIN-A) 0.1 % topical cream     lamoTRIgine (LaMICtal) 150 mg tablet Take  by mouth daily. No current facility-administered medications for this visit.        Past Medical History:   Diagnosis Date    Acne     Allergic rhinitis     Bipolar 2 disorder ([facility] [address]     Dr Donna Soriano and other eczema, due to unspecified cause     Fibroids     Hypercholesterolemia         Past Surgical History:   Procedure Laterality Date    HX HYSTERECTOMY  2008    partial    HX KNEE ARTHROSCOPY      right knee    HX ORTHOPAEDIC      hand trigger release    HX ORTHOPAEDIC      foot surgery-yuly. do's neuroma       Family History   Problem Relation Age of Onset    Diabetes Mother     High Cholesterol Mother     Hypertension Mother     Asthma Mother     Elevated Lipids Mother     Hypertension Father     Cancer Father         Prostrate        Social History     Socioeconomic History    Marital status:      Spouse name: Not on file    Number of children: Not on file    Years of education: Not on file    Highest education level: Not on file   Occupational History    Not on file   Tobacco Use    Smoking status: Former     Packs/day: 0.25     Years: 30.00     Pack years: 7.50     Types: Cigarettes     Quit date: 2011     Years since quittin.7    Smokeless tobacco: Current    Tobacco comments:     vape    Vaping Use    Vaping Use: Every day    Substances: Nicotine, THC   Substance and Sexual Activity    Alcohol use:  Yes     Alcohol/week: 7.0 standard drinks     Types: 7 Glasses of wine per week    Drug use: No    Sexual activity: Yes     Partners: Male     Birth control/protection: None   Other Topics Concern    Not on file   Social History Narrative    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            Vitals:  Ht 5' 3\" (1.6 m)   Wt 155 lb (70.3 kg)   BMI 27.46 kg/m² Body mass index is 27.46 kg/m². PHYSICAL EXAM:  On physical the patient has significant medial joint line tenderness but there is moderate-sized effusion. Hip is painless. Right knee extends fully and flexes to about 90 degrees limited by the effusion. Varus test is positive medially right lower extremity otherwise sensate and perfused    IMAGING:  XR Results (most recent):  Results from Appointment encounter on 03/21/23    XR KNEE RT 3 V    Narrative  Xrays [facility] x-ray both knees normal.      Patient did bring in some x-rays. They were weightbearing films. I reviewed them. Her joint spaces appear well-maintained on the films that she brought in.      ASSESSMENT/PLAN:  1. Right knee pain, unspecified chronicity  -     XR KNEE RT 3 V; Future  2. Effusion of right knee    Patient has minimal arthritic changes radiographically. I do not know that these films are actually of high quality as I looked at them on her cell phone. Plan for her is to get her through her trip which starts next week. She will see me next Tuesday for shot of corticosteroid. After she returns from her trip I would like to obtain new weightbearing x-rays and likely get an MRI to evaluate for a repeat medial meniscus tear. An electronic signature was used to authenticate this note.   --Bibi Esparza MD

## 2023-03-21 NOTE — LETTER
3/21/2023    Patient: Scot Prader   YOB: 1970   Date of Visit: 3/21/2023     Stormy Linton MD  [address] [address] [facility] [id number]  [place] [place] [facility] [address]    Dear Stormy Linton MD,      Thank you for referring Ms. Binh Lobo to [facility] for evaluation. My notes for this consultation are attached. If you have questions, please do not hesitate to call me. I look forward to following your patient along with you.       Sincerely,    Mick Cortes MD

## 2023-03-22 ENCOUNTER — OFFICE VISIT (OUTPATIENT)
Dept: INTERNAL MEDICINE CLINIC | Age: 53
End: 2023-03-22
Payer: COMMERCIAL

## 2023-03-22 VITALS
HEART RATE: 76 BPM | TEMPERATURE: 97.1 F | SYSTOLIC BLOOD PRESSURE: 127 MMHG | BODY MASS INDEX: 27.78 KG/M2 | HEIGHT: 63 IN | DIASTOLIC BLOOD PRESSURE: 86 MMHG | OXYGEN SATURATION: 98 % | RESPIRATION RATE: 18 BRPM | WEIGHT: 156.8 LBS

## 2023-03-22 DIAGNOSIS — Z00.00 ROUTINE PHYSICAL EXAMINATION: ICD-10-CM

## 2023-03-22 DIAGNOSIS — R03.0 ELEVATED BLOOD PRESSURE READING IN OFFICE WITHOUT DIAGNOSIS OF HYPERTENSION: ICD-10-CM

## 2023-03-22 DIAGNOSIS — F41.1 GENERALIZED ANXIETY DISORDER: ICD-10-CM

## 2023-03-22 PROBLEM — M17.11 PRIMARY OSTEOARTHRITIS OF RIGHT KNEE: Status: ACTIVE | Noted: 2023-03-09

## 2023-03-22 PROCEDURE — 99213 OFFICE O/P EST LOW 20 MIN: CPT | Performed by: INTERNAL MEDICINE

## 2023-03-22 RX ORDER — MULTIVIT WITH MINERALS/HERBS
1 TABLET ORAL DAILY
COMMUNITY

## 2023-03-22 NOTE — ASSESSMENT & PLAN NOTE
Discussed continued monitoring at home, reviewed data and estimate avg 135/90  OK to hold off on meds right now  Threshold for me would be consistently >140 and/or 95  Recommend low salt diet, drinking more water, regular cardio exercise, and smoking cessation to help naturally lower BP

## 2023-03-22 NOTE — PROGRESS NOTES
Chief Complaint   Patient presents with    Hypertension     Blood pressure 127/86, pulse 76, temperature 97.1 °F (36.2 °C), temperature source Temporal, resp. rate 18, height 5' 3\" (1.6 m), weight 156 lb 12.8 oz (71.1 kg), SpO2 98 %.

## 2023-03-22 NOTE — PROGRESS NOTES
3/22/2023    Chen Gresham 1970 is a 45y.o. year old female established patient,   here for evaluation of the following chief complaint(s):  Chief Complaint   Patient presents with    Hypertension           ASSESSMENT/PLAN:  Below is the assessment and plan developed based on review of pertinent history, physical exam, labs, studies, and medications. 1. Elevated blood pressure reading in office without diagnosis of hypertension  Assessment & Plan:  Discussed continued monitoring at home, reviewed data and estimate avg 135/90  OK to hold off on meds right now  Threshold for me would be consistently >140 and/or 95  Recommend low salt diet, drinking more water, regular cardio exercise, and smoking cessation to help naturally lower BP    2. Generalized anxiety disorder  Assessment & Plan:   Meds per psych, symptoms better lately  Discussed how mood does play a role in BP elevations  3. Routine physical examination  -     CBC WITH AUTOMATED DIFF; Future  -     METABOLIC PANEL, COMPREHENSIVE; Future  -     TSH 3RD GENERATION; Future  -     HEMOGLOBIN A1C WITH EAG; Future  -     LIPID PANEL; Future     Labs ordered in anticipation of physical this summer    Follow-up and Dispositions    Return for upcoming visit as scheduled. SUBJECTIVE/OBJECTIVE:  Blood pressure concerns  She had a high reading 150s/100 at ortho visit recently. Having eval for acute right knee pain, had a steroid shot and going to get another one next week. She is concerned as [place]. Has been checking at home and mostly 130s/90s. She has made a job switch and that makes a positive impact on her stress levels. Review of Systems   Constitutional:  Negative for chills and fever. Respiratory:  Negative for cough and shortness of breath. Cardiovascular:  Negative for chest pain, palpitations and leg swelling. Gastrointestinal:  Negative for abdominal pain.    Musculoskeletal:  Positive for joint pain (R knee). Skin:  Negative for rash. Physical Exam  Constitutional:       General: She is not in acute distress. Appearance: Normal appearance. She is not ill-appearing. HENT:      Head: Normocephalic and atraumatic. Eyes:      Conjunctiva/sclera: Conjunctivae normal.   Cardiovascular:      Rate and Rhythm: Normal rate and regular rhythm. Heart sounds: No murmur heard. Pulmonary:      Effort: Pulmonary effort is normal.      Breath sounds: Normal breath sounds. No wheezing. Musculoskeletal:      Right lower leg: No edema. Left lower leg: No edema. Skin:     General: Skin is warm and dry. Neurological:      General: No focal deficit present. Mental Status: She is alert and oriented to person, place, and time. Mental status is at baseline. Psychiatric:         Mood and Affect: Mood normal.         Thought Content: Thought content normal.         Judgment: Judgment normal.        Vitals:    03/22/23 1605   BP: 127/86   Pulse: 76   Resp: 18   Temp: 97.1 °F (36.2 °C)   TempSrc: Temporal   SpO2: 98%   Weight: 156 lb 12.8 oz (71.1 kg)   Height: 5' 3\" (1.6 m)        The following sections were reviewed & updated as appropriate: Problem List, Allergies, PMH, PSH, FH, and SH. 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 ascorbic acid/collagen hydr (COLLAGEN PLUS VITAMIN C PO) Take  by mouth.      b complex vitamins tablet Take 1 Tablet by mouth daily. docosahexaenoic acid/epa (FISH OIL PO) Take  by mouth daily. valACYclovir (VALTREX) 1 gram tablet Take 2 at onset of cold sore, repeat once in 12 hours 12 Tablet 1    estradiol (ESTRACE) 0.5 mg tablet TAKE 1 TABLET BY MOUTH EVERY DAY  12    MAGNESIUM PO Take  by mouth. cyclobenzaprine (FLEXERIL) 10 mg tablet Take 1 Tab by mouth three (3) times daily as needed for Muscle Spasm(s). 30 Tab 2    desonide (TRIDESILON) 0.05 % cream PRN  0    Adapalene-Benzoyl Peroxide 0.1-2.5 % gel by Apply Externally route. acetaminophen (TYLENOL) 325 mg tablet Take  by mouth every four (4) hours as needed. LORazepam (ATIVAN) 0.5 mg tablet Take  by mouth. cholecalciferol (VITAMIN D3) 1,000 unit tablet Take 5 Tabs by mouth daily. Takes as a 5000 unit cap 30 Tab 12    tretinoin (RETIN-A) 0.1 % topical cream       lamoTRIgine (LaMICtal) 150 mg tablet Take  by mouth daily. Aleve [naproxen sodium], Nsaids (non-steroidal anti-inflammatory drug), Pcn [penicillins], and Percocet [oxycodone-acetaminophen]    Social History     Occupational History    Not on file   Tobacco Use    Smoking status: Former     Packs/day: 0.25     Years: 30.00     Pack years: 7.50     Types: Cigarettes     Quit date: 2011     Years since quittin.7    Smokeless tobacco: Current    Tobacco comments:     vape    Vaping Use    Vaping Use: Every day    Substances: Nicotine, THC   Substance and Sexual Activity    Alcohol use: Yes     Alcohol/week: 7.0 standard drinks     Types: 7 Glasses of wine per week    Drug use: No    Sexual activity: Yes     Partners: Male     Birth control/protection: None            Disclaimer:  Aspects of this note may have been generated using Dragon voice recognition software.  Despite editing, there may be some syntax errors   We discussed the expected course, resolution and complications of the diagnosis(es) in detail. I have discussed any significant medication side effects and warnings with the patient when indicated. I advised them to contact the office if their condition worsens, changes or fails to improve as anticipated. Patient expressed understanding of the diagnosis and plan. An electronic signature was used to authenticate this note.   -- Suzy Louis MD

## 2023-03-28 ENCOUNTER — OFFICE VISIT (OUTPATIENT)
Dept: ORTHOPEDIC SURGERY | Age: 53
End: 2023-03-28
Payer: COMMERCIAL

## 2023-03-28 DIAGNOSIS — M25.461 EFFUSION OF RIGHT KNEE: Primary | ICD-10-CM

## 2023-03-28 DIAGNOSIS — M17.11 PRIMARY OSTEOARTHRITIS OF RIGHT KNEE: ICD-10-CM

## 2023-03-28 PROCEDURE — 99024 POSTOP FOLLOW-UP VISIT: CPT | Performed by: ORTHOPAEDIC SURGERY

## 2023-03-28 PROCEDURE — 20610 DRAIN/INJ JOINT/BURSA W/O US: CPT | Performed by: ORTHOPAEDIC SURGERY

## 2023-03-28 RX ORDER — TRIAMCINOLONE ACETONIDE 40 MG/ML
40 INJECTION, SUSPENSION INTRA-ARTICULAR; INTRAMUSCULAR ONCE
Status: COMPLETED | OUTPATIENT
Start: 2023-03-28 | End: 2023-03-28

## 2023-03-28 RX ADMIN — TRIAMCINOLONE ACETONIDE 40 MG: 40 INJECTION, SUSPENSION INTRA-ARTICULAR; INTRAMUSCULAR at 17:16

## 2023-03-28 NOTE — PROGRESS NOTES
Paul Ernandez (: 1970) is a 45 y.o. female, patient, here for evaluation of the following chief complaint(s):  No chief complaint on file. HPI:    Injection note  Allergies   Allergen Reactions    Aleve [Naproxen Sodium] Other (comments)     Oral ulcer    Nsaids (Non-Steroidal Anti-Inflammatory Drug) Other (comments)     Ulcerated mucous membranes    Pcn [Penicillins] Hives    Percocet [Oxycodone-Acetaminophen] Itching       Current Outpatient Medications   Medication Sig    TURMERIC PO Take  by mouth. ascorbic acid/collagen hydr (COLLAGEN PLUS VITAMIN C PO) Take  by mouth.    b complex vitamins tablet Take 1 Tablet by mouth daily. docosahexaenoic acid/epa (FISH OIL PO) Take  by mouth daily. valACYclovir (VALTREX) 1 gram tablet Take 2 at onset of cold sore, repeat once in 12 hours    estradiol (ESTRACE) 0.5 mg tablet TAKE 1 TABLET BY MOUTH EVERY DAY    MAGNESIUM PO Take  by mouth. cyclobenzaprine (FLEXERIL) 10 mg tablet Take 1 Tab by mouth three (3) times daily as needed for Muscle Spasm(s). desonide (TRIDESILON) 0.05 % cream PRN    Adapalene-Benzoyl Peroxide 0.1-2.5 % gel by Apply Externally route. acetaminophen (TYLENOL) 325 mg tablet Take  by mouth every four (4) hours as needed. LORazepam (ATIVAN) 0.5 mg tablet Take  by mouth. cholecalciferol (VITAMIN D3) 1,000 unit tablet Take 5 Tabs by mouth daily. Takes as a 5000 unit cap    tretinoin (RETIN-A) 0.1 % topical cream     lamoTRIgine (LaMICtal) 150 mg tablet Take  by mouth daily. No current facility-administered medications for this visit.        Past Medical History:   Diagnosis Date    Acne     Allergic rhinitis     Bipolar 2 disorder (HCC)     Dr Pack Sat    Contact dermatitis and other eczema, due to unspecified cause     Fibroids     Hypercholesterolemia         Past Surgical History:   Procedure Laterality Date    HX HYSTERECTOMY  2008    partial    HX KNEE ARTHROSCOPY      right knee    HX ORTHOPAEDIC      hand trigger release    HX ORTHOPAEDIC      foot surgery-yuly. do's neuroma       Family History   Problem Relation Age of Onset    Diabetes Mother     High Cholesterol Mother     Hypertension Mother     Asthma Mother     Elevated Lipids Mother     Hypertension Father     Cancer Father         Prostrate        Social History     Socioeconomic History    Marital status:      Spouse name: Not on file    Number of children: Not on file    Years of education: Not on file    Highest education level: Not on file   Occupational History    Not on file   Tobacco Use    Smoking status: Former     Packs/day: 0.25     Years: 30.00     Pack years: 7.50     Types: Cigarettes     Quit date: 2011     Years since quittin.7    Smokeless tobacco: Current    Tobacco comments:     vape    Vaping Use    Vaping Use: Every day    Substances: Nicotine, THC   Substance and Sexual Activity    Alcohol use: Yes     Alcohol/week: 7.0 standard drinks     Types: 7 Glasses of wine per week    Drug use: No    Sexual activity: Yes     Partners: Male     Birth control/protection: None   Other Topics Concern    Not on file   Social History Narrative    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 There is no height or weight on file to calculate BMI. PHYSICAL EXAM:  On exam patient has benign appearing knee. ASSESSMENT/PLAN:  1.  Effusion of right knee  - triamcinolone acetonide (KENALOG-40) 40 mg/mL injection 40 mg; 40 mg, Intra artICUlar, ONCE, 1 dose, On Tue 3/28/23 at 1800  2. Primary osteoarthritis of right knee  -     triamcinolone acetonide (KENALOG-40) 40 mg/mL injection 40 mg; 40 mg, Intra artICUlar, ONCE, 1 dose, On Tue 3/28/23 at 1800  Discussed risks/benefits of cortisone injection and patient gave verbal consent. Under sterile conditions, the right knee was injected 1 cc 40 mg/cc Kenalog intra-articularly, tolerated the procedure well. An electronic signature was used to authenticate this note.   --Aubrey Tolbert MD

## 2023-03-28 NOTE — LETTER
3/28/2023    Patient: Edinson Beard   YOB: 1970   Date of Visit: 3/28/2023     Patricia Garcia MD  [address] [address] [facility] [phone]  [place] [place]    Dear Patricia Garcia MD,      Thank you for referring Ms. Nam Adorno to [facility] for evaluation. My notes for this consultation are attached. If you have questions, please do not hesitate to call me. I look forward to following your patient along with you.       Sincerely,    Liz Gallagher MD

## 2023-04-23 DIAGNOSIS — Z00.00 ROUTINE PHYSICAL EXAMINATION: Primary | ICD-10-CM

## 2023-04-24 ENCOUNTER — TELEPHONE (OUTPATIENT)
Dept: ORTHOPEDIC SURGERY | Age: 53
End: 2023-04-24

## 2023-04-24 DIAGNOSIS — Z00.00 ROUTINE PHYSICAL EXAMINATION: Primary | ICD-10-CM

## 2023-04-24 NOTE — TELEPHONE ENCOUNTER
Patient called to report cortisone injection given on 03/28/23 did not give her relief for long, stated Dr. Court Murray said he'd order an MRI if the injection didn't work for patient, no documentation of this in the patient last office note, notified patient I'd consult with Dr. Court Murray and will reach back out with his decision.

## 2023-04-26 DIAGNOSIS — M25.461 EFFUSION OF RIGHT KNEE: ICD-10-CM

## 2023-04-26 DIAGNOSIS — M17.11 PRIMARY OSTEOARTHRITIS OF RIGHT KNEE: ICD-10-CM

## 2023-04-26 DIAGNOSIS — S83.241A ACUTE MEDIAL MENISCUS TEAR OF RIGHT KNEE, INITIAL ENCOUNTER: Primary | ICD-10-CM

## 2023-05-26 ENCOUNTER — OFFICE VISIT (OUTPATIENT)
Age: 53
End: 2023-05-26
Payer: COMMERCIAL

## 2023-05-26 VITALS
SYSTOLIC BLOOD PRESSURE: 134 MMHG | DIASTOLIC BLOOD PRESSURE: 88 MMHG | RESPIRATION RATE: 18 BRPM | HEART RATE: 80 BPM | BODY MASS INDEX: 27.64 KG/M2 | OXYGEN SATURATION: 98 % | WEIGHT: 156 LBS | HEIGHT: 63 IN | TEMPERATURE: 97.1 F

## 2023-05-26 DIAGNOSIS — S83.231S COMPLEX TEAR OF MEDIAL MENISCUS OF RIGHT KNEE AS CURRENT INJURY, SEQUELA: ICD-10-CM

## 2023-05-26 DIAGNOSIS — Z01.818 PREOP EXAMINATION: Primary | ICD-10-CM

## 2023-05-26 PROBLEM — R03.0 ELEVATED BLOOD PRESSURE READING IN OFFICE WITHOUT DIAGNOSIS OF HYPERTENSION: Status: ACTIVE | Noted: 2023-01-05

## 2023-05-26 PROBLEM — F34.0 CYCLOTHYMIA: Status: ACTIVE | Noted: 2020-08-02

## 2023-05-26 PROBLEM — F41.1 GENERALIZED ANXIETY DISORDER: Status: ACTIVE | Noted: 2023-01-05

## 2023-05-26 PROCEDURE — 99214 OFFICE O/P EST MOD 30 MIN: CPT | Performed by: INTERNAL MEDICINE

## 2023-05-26 RX ORDER — VIT C/B6/B5/MAGNESIUM/HERB 173 50-5-6-5MG
CAPSULE ORAL DAILY
COMMUNITY

## 2023-05-26 RX ORDER — CYANOCOBALAMIN (VITAMIN B-12) 500 MCG
TABLET ORAL
COMMUNITY

## 2023-05-26 RX ORDER — VITAMIN B COMPLEX
1 CAPSULE ORAL DAILY
COMMUNITY

## 2023-05-26 SDOH — ECONOMIC STABILITY: INCOME INSECURITY: HOW HARD IS IT FOR YOU TO PAY FOR THE VERY BASICS LIKE FOOD, HOUSING, MEDICAL CARE, AND HEATING?: NOT HARD AT ALL

## 2023-05-26 SDOH — ECONOMIC STABILITY: HOUSING INSECURITY
IN THE LAST 12 MONTHS, WAS THERE A TIME WHEN YOU DID NOT HAVE A STEADY PLACE TO SLEEP OR SLEPT IN A SHELTER (INCLUDING NOW)?: NO

## 2023-05-26 SDOH — ECONOMIC STABILITY: FOOD INSECURITY: WITHIN THE PAST 12 MONTHS, YOU WORRIED THAT YOUR FOOD WOULD RUN OUT BEFORE YOU GOT MONEY TO BUY MORE.: NEVER TRUE

## 2023-05-26 SDOH — ECONOMIC STABILITY: FOOD INSECURITY: WITHIN THE PAST 12 MONTHS, THE FOOD YOU BOUGHT JUST DIDN'T LAST AND YOU DIDN'T HAVE MONEY TO GET MORE.: NEVER TRUE

## 2023-05-26 ASSESSMENT — ENCOUNTER SYMPTOMS
SHORTNESS OF BREATH: 0
ABDOMINAL PAIN: 0
COUGH: 0

## 2023-05-26 NOTE — PROGRESS NOTES
Chief Complaint   Patient presents with    Pre-op Exam     Blood pressure 134/88, pulse 80, temperature 97.1 °F (36.2 °C), temperature source Temporal, resp. rate 18, height 5' 3.2\" (1.605 m), weight 156 lb (70.8 kg), SpO2 98 %.

## 2023-05-26 NOTE — PROGRESS NOTES
Preoperative Evaluation:   Tomas Smith is 52 y.o. female (1970) who presents for preoperative evaluation. Procedure/Surgery: R knee arthroscopy for meniscal injury   Date of Procedure/Surgery: 6/14/2023   Surgeon: Dr Daly Arrant:    [facility]  Primary Physician: Carlin David MD     Reason for surgery: R knee pain, meniscus injury      Latex Allergy: No  Recent use of: No recent use of aspirin (ASA), NSAIDS or steroids  Tetanus up to date: last tetanus booster within 10 years  Anesthesia Complications: None  History of abnormal bleeding : None  History of Blood Transfusions: No        EKG: no indicated  Labs: to follow      Pre-Operative Risk Assessment Using 2014 ACC/AHA Guidelines     Emergent procedure: No  Active Cardiac Condition including decompensated HF, Arrhythmia, MI <3 weeks, severe valve disease: No  Risk Level of Procedure: Intermediate Risk (intraperitoneal, intrathoracic, HENT, orthopedic, or carotid endarterectomy, etc.)  Revised Cardiac Risk Index Risk factors: None  Measurement of Exercise Tolerance before Surgery is >4 METS (climbing > 1 flight of stairs without stopping, walking up hill > 1-2 blocks, scrubbing floors, moving furniture, golf, bowling, dancing or tennis, or running short distance): Yes    According to the 2014 ACC/AHA pre-operative risk assessment guidelines Hussein Thomas is at low risk for major cardiac complications during a intermediate risk procedure and procedure may proceed as planned. Medication Recommendations: hold supplements, continue regular Rx meds perioperativly         Prior to Admission medications    Medication Sig Start Date End Date Taking?  Authorizing Provider   turmeric 500 MG CAPS Take by mouth daily   Yes Historical Provider, MD   Omega-3 Fatty Acids (FISH OIL) 300 MG CAPS Take by mouth   Yes Historical Provider, MD   b complex vitamins capsule Take 1 capsule by mouth daily   Yes

## 2023-05-30 DIAGNOSIS — Z01.818 PREOP EXAMINATION: ICD-10-CM

## 2023-05-30 DIAGNOSIS — S83.231S COMPLEX TEAR OF MEDIAL MENISCUS OF RIGHT KNEE AS CURRENT INJURY, SEQUELA: ICD-10-CM

## 2023-06-09 LAB
APPEARANCE UR: CLEAR
BACTERIA #/AREA URNS HPF: ABNORMAL /[HPF]
BACTERIA UR CULT: NORMAL
BASOPHILS # BLD AUTO: 0.1 X10E3/UL (ref 0–0.2)
BASOPHILS NFR BLD AUTO: 1 %
BILIRUB UR QL STRIP: NEGATIVE
BUN SERPL-MCNC: 7 MG/DL (ref 6–24)
BUN/CREAT SERPL: 8 (ref 9–23)
CALCIUM SERPL-MCNC: 9.7 MG/DL (ref 8.7–10.2)
CASTS URNS QL MICRO: ABNORMAL /LPF
CHLORIDE SERPL-SCNC: 105 MMOL/L (ref 96–106)
CO2 SERPL-SCNC: 23 MMOL/L (ref 20–29)
COLOR UR: YELLOW
CREAT SERPL-MCNC: 0.86 MG/DL (ref 0.57–1)
EGFRCR SERPLBLD CKD-EPI 2021: 80 ML/MIN/1.73
EOSINOPHIL # BLD AUTO: 0.1 X10E3/UL (ref 0–0.4)
EOSINOPHIL NFR BLD AUTO: 2 %
EPI CELLS #/AREA URNS HPF: >10 /HPF (ref 0–10)
ERYTHROCYTE [DISTWIDTH] IN BLOOD BY AUTOMATED COUNT: 12.4 % (ref 11.7–15.4)
GLUCOSE SERPL-MCNC: 85 MG/DL (ref 70–99)
GLUCOSE UR QL STRIP: NEGATIVE
HBA1C MFR BLD: 5.8 % (ref 4.8–5.6)
HCT VFR BLD AUTO: 38.9 % (ref 34–46.6)
HGB BLD-MCNC: 13.4 G/DL (ref 11.1–15.9)
HGB UR QL STRIP: NEGATIVE
IMM GRANULOCYTES # BLD AUTO: 0 X10E3/UL (ref 0–0.1)
IMM GRANULOCYTES NFR BLD AUTO: 0 %
INR PPP: 1 (ref 0.9–1.2)
KETONES UR QL STRIP: NEGATIVE
LEUKOCYTE ESTERASE UR QL STRIP: NEGATIVE
LYMPHOCYTES # BLD AUTO: 1.3 X10E3/UL (ref 0.7–3.1)
LYMPHOCYTES NFR BLD AUTO: 22 %
MCH RBC QN AUTO: 30.6 PG (ref 26.6–33)
MCHC RBC AUTO-ENTMCNC: 34.4 G/DL (ref 31.5–35.7)
MCV RBC AUTO: 89 FL (ref 79–97)
MICRO URNS: NORMAL
MICRO URNS: NORMAL
MONOCYTES # BLD AUTO: 0.5 X10E3/UL (ref 0.1–0.9)
MONOCYTES NFR BLD AUTO: 8 %
NEUTROPHILS # BLD AUTO: 4 X10E3/UL (ref 1.4–7)
NEUTROPHILS NFR BLD AUTO: 67 %
NITRITE UR QL STRIP: NEGATIVE
PH UR STRIP: 6 [PH] (ref 5–7.5)
PLATELET # BLD AUTO: 318 X10E3/UL (ref 150–450)
POTASSIUM SERPL-SCNC: 4.2 MMOL/L (ref 3.5–5.2)
PROT UR QL STRIP: NEGATIVE
PROTHROMBIN TIME: 10.3 SEC (ref 9.1–12)
RBC # BLD AUTO: 4.38 X10E6/UL (ref 3.77–5.28)
RBC #/AREA URNS HPF: ABNORMAL /HPF (ref 0–2)
SODIUM SERPL-SCNC: 141 MMOL/L (ref 134–144)
SP GR UR STRIP: 1.01 (ref 1–1.03)
UROBILINOGEN UR STRIP-MCNC: 0.2 MG/DL (ref 0.2–1)
WBC # BLD AUTO: 6 X10E3/UL (ref 3.4–10.8)
WBC #/AREA URNS HPF: ABNORMAL /HPF (ref 0–5)

## 2023-07-27 LAB
ALBUMIN SERPL-MCNC: 4.3 G/DL (ref 3.8–4.9)
ALBUMIN/GLOB SERPL: 1.7 {RATIO} (ref 1.2–2.2)
ALP SERPL-CCNC: 89 IU/L (ref 44–121)
ALT SERPL-CCNC: 24 IU/L (ref 0–32)
AST SERPL-CCNC: 29 IU/L (ref 0–40)
BASOPHILS # BLD AUTO: 0.1 X10E3/UL (ref 0–0.2)
BASOPHILS NFR BLD AUTO: 1 %
BILIRUB SERPL-MCNC: 0.2 MG/DL (ref 0–1.2)
BUN SERPL-MCNC: 7 MG/DL (ref 6–24)
BUN/CREAT SERPL: 8 (ref 9–23)
CALCIUM SERPL-MCNC: 9.6 MG/DL (ref 8.7–10.2)
CHLORIDE SERPL-SCNC: 105 MMOL/L (ref 96–106)
CHOLEST SERPL-MCNC: 238 MG/DL (ref 100–199)
CO2 SERPL-SCNC: 24 MMOL/L (ref 20–29)
CREAT SERPL-MCNC: 0.88 MG/DL (ref 0.57–1)
EGFRCR SERPLBLD CKD-EPI 2021: 78 ML/MIN/1.73
EOSINOPHIL # BLD AUTO: 0.1 X10E3/UL (ref 0–0.4)
EOSINOPHIL NFR BLD AUTO: 2 %
ERYTHROCYTE [DISTWIDTH] IN BLOOD BY AUTOMATED COUNT: 12.6 % (ref 11.7–15.4)
EST. AVERAGE GLUCOSE BLD GHB EST-MCNC: 123 MG/DL
GLOBULIN SER CALC-MCNC: 2.6 G/DL (ref 1.5–4.5)
GLUCOSE SERPL-MCNC: 112 MG/DL (ref 70–99)
HBA1C MFR BLD: 5.9 % (ref 4.8–5.6)
HCT VFR BLD AUTO: 37.7 % (ref 34–46.6)
HDLC SERPL-MCNC: 51 MG/DL
HGB BLD-MCNC: 12.7 G/DL (ref 11.1–15.9)
IMM GRANULOCYTES # BLD AUTO: 0 X10E3/UL (ref 0–0.1)
IMM GRANULOCYTES NFR BLD AUTO: 0 %
LDLC SERPL CALC-MCNC: 173 MG/DL (ref 0–99)
LYMPHOCYTES # BLD AUTO: 1.1 X10E3/UL (ref 0.7–3.1)
LYMPHOCYTES NFR BLD AUTO: 21 %
MCH RBC QN AUTO: 30.2 PG (ref 26.6–33)
MCHC RBC AUTO-ENTMCNC: 33.7 G/DL (ref 31.5–35.7)
MCV RBC AUTO: 90 FL (ref 79–97)
MONOCYTES # BLD AUTO: 0.4 X10E3/UL (ref 0.1–0.9)
MONOCYTES NFR BLD AUTO: 8 %
NEUTROPHILS # BLD AUTO: 3.6 X10E3/UL (ref 1.4–7)
NEUTROPHILS NFR BLD AUTO: 68 %
PLATELET # BLD AUTO: 342 X10E3/UL (ref 150–450)
POTASSIUM SERPL-SCNC: 4 MMOL/L (ref 3.5–5.2)
PROT SERPL-MCNC: 6.9 G/DL (ref 6–8.5)
RBC # BLD AUTO: 4.21 X10E6/UL (ref 3.77–5.28)
SODIUM SERPL-SCNC: 144 MMOL/L (ref 134–144)
TRIGL SERPL-MCNC: 83 MG/DL (ref 0–149)
TSH SERPL DL<=0.005 MIU/L-ACNC: 1.71 UIU/ML (ref 0.45–4.5)
VLDLC SERPL CALC-MCNC: 14 MG/DL (ref 5–40)
WBC # BLD AUTO: 5.3 X10E3/UL (ref 3.4–10.8)

## 2023-08-14 ENCOUNTER — OFFICE VISIT (OUTPATIENT)
Age: 53
End: 2023-08-14
Payer: COMMERCIAL

## 2023-08-14 VITALS
WEIGHT: 151 LBS | DIASTOLIC BLOOD PRESSURE: 83 MMHG | OXYGEN SATURATION: 97 % | HEIGHT: 63 IN | BODY MASS INDEX: 26.75 KG/M2 | RESPIRATION RATE: 16 BRPM | SYSTOLIC BLOOD PRESSURE: 137 MMHG | TEMPERATURE: 97.8 F | HEART RATE: 87 BPM

## 2023-08-14 DIAGNOSIS — E78.00 PURE HYPERCHOLESTEROLEMIA: ICD-10-CM

## 2023-08-14 DIAGNOSIS — M35.00 SJOGREN'S SYNDROME, WITH UNSPECIFIED ORGAN INVOLVEMENT (HCC): ICD-10-CM

## 2023-08-14 DIAGNOSIS — F41.1 GENERALIZED ANXIETY DISORDER: ICD-10-CM

## 2023-08-14 DIAGNOSIS — R73.03 PREDIABETES: ICD-10-CM

## 2023-08-14 DIAGNOSIS — M17.11 PRIMARY OSTEOARTHRITIS OF RIGHT KNEE: ICD-10-CM

## 2023-08-14 DIAGNOSIS — Z00.00 ROUTINE PHYSICAL EXAMINATION: Primary | ICD-10-CM

## 2023-08-14 PROCEDURE — 99396 PREV VISIT EST AGE 40-64: CPT | Performed by: INTERNAL MEDICINE

## 2023-08-14 ASSESSMENT — ENCOUNTER SYMPTOMS
EYES NEGATIVE: 1
COUGH: 0
NAUSEA: 0
SHORTNESS OF BREATH: 0
CONSTIPATION: 0
VOMITING: 0
DIARRHEA: 0
ABDOMINAL PAIN: 0

## 2023-08-14 NOTE — PROGRESS NOTES
8/14/2023    Loy Singh is a 52 y.o. female who was seen in clinic today for a full physical.             Assessment & Plan:   Below is the assessment and plan developed based on review of pertinent history, physical exam, labs, studies, and medications. 1. Routine physical examination  2. Generalized anxiety disorder  Assessment & Plan:   Dr Sherlyn Ruiz for psych  Rare use of lorazepam  Mood symptoms controlled on current regimen   3. Primary osteoarthritis of right knee  Assessment & Plan:  Still doing PT, currently on steroids after arthroscopy  4. Pure hypercholesterolemia  Assessment & Plan:     Lab Results   Component Value Date    LDLCALC 173 (H) 07/26/2023     Discussed Ct calcium score to help risk stratify    Orders:  -     CT CARDIAC CALCIUM SCORING; Future  5. Prediabetes  Assessment & Plan:     Hemoglobin A1C   Date Value Ref Range Status   07/26/2023 5.9 (H) 4.8 - 5.6 % Final     Comment:              Prediabetes: 5.7 - 6.4           Diabetes: >6.4           Glycemic control for adults with diabetes: <7.0       Discussed diet and exercise strategies  6. Sjogren's syndrome, with unspecified organ involvement [facility])  Assessment & Plan:   Symptoms have been overall quiescent so not on therapies, monitors   annually with rheum Dr Brooklyn De La Rosa, CMP, TSH WNL    Return in about 6 months (around 2/14/2024) for chronic follow up.       Mary Pedro is here today for a full physical.        Diet and exercise habits: could improve on sweets, walking more, doing PT for knee      Depression Screen:  No data recorded     Health Maintenance:     Health Maintenance   Topic Date Due    COVID-19 Vaccine (4 - Booster for Pfizer series) 12/24/2021    Shingles vaccine (2 of 2) 12/02/2022    Flu vaccine (1) Never done    Breast cancer screen  11/02/2023    Depression Screen  01/05/2024    A1C test (Diabetic or Prediabetic)  07/26/2024    Colorectal Cancer Screen  02/07/2025    Lipids  07/26/2028

## 2023-08-14 NOTE — ASSESSMENT & PLAN NOTE
Hemoglobin A1C   Date Value Ref Range Status   07/26/2023 5.9 (H) 4.8 - 5.6 % Final     Comment:              Prediabetes: 5.7 - 6.4           Diabetes: >6.4           Glycemic control for adults with diabetes: <7.0       Discussed diet and exercise strategies

## 2023-08-14 NOTE — ASSESSMENT & PLAN NOTE
Lab Results   Component Value Date    LDLCALC 173 (H) 07/26/2023     Discussed Ct calcium score to help risk stretify

## 2023-08-14 NOTE — ASSESSMENT & PLAN NOTE
Symptoms have been overall quiescent so not on therapies, monitors   annually with rheum Dr Elieen Duran

## 2023-09-29 ENCOUNTER — HOSPITAL ENCOUNTER (OUTPATIENT)
Facility: HOSPITAL | Age: 53
Discharge: HOME | End: 2023-09-29

## 2023-09-29 DIAGNOSIS — E78.00 PURE HYPERCHOLESTEROLEMIA: ICD-10-CM

## 2023-09-29 PROCEDURE — 75571 CT HRT W/O DYE W/CA TEST: CPT

## 2024-01-22 LAB — MAMMOGRAPHY, EXTERNAL: NORMAL

## 2024-09-16 ENCOUNTER — OFFICE VISIT (OUTPATIENT)
Age: 54
End: 2024-09-16
Payer: COMMERCIAL

## 2024-09-16 VITALS
BODY MASS INDEX: 25.66 KG/M2 | WEIGHT: 144.8 LBS | RESPIRATION RATE: 16 BRPM | OXYGEN SATURATION: 98 % | TEMPERATURE: 98 F | HEART RATE: 84 BPM | HEIGHT: 63 IN | SYSTOLIC BLOOD PRESSURE: 137 MMHG | DIASTOLIC BLOOD PRESSURE: 80 MMHG

## 2024-09-16 DIAGNOSIS — R40.0 DAYTIME SOMNOLENCE: ICD-10-CM

## 2024-09-16 DIAGNOSIS — R53.83 OTHER FATIGUE: ICD-10-CM

## 2024-09-16 DIAGNOSIS — R73.03 PREDIABETES: ICD-10-CM

## 2024-09-16 DIAGNOSIS — F41.1 GENERALIZED ANXIETY DISORDER: ICD-10-CM

## 2024-09-16 DIAGNOSIS — R23.3 PETECHIAL RASH: ICD-10-CM

## 2024-09-16 DIAGNOSIS — E78.00 PURE HYPERCHOLESTEROLEMIA: ICD-10-CM

## 2024-09-16 DIAGNOSIS — Z00.00 ROUTINE PHYSICAL EXAMINATION: Primary | ICD-10-CM

## 2024-09-16 DIAGNOSIS — Z00.00 ROUTINE PHYSICAL EXAMINATION: ICD-10-CM

## 2024-09-16 DIAGNOSIS — M35.00 SJOGREN'S SYNDROME, WITH UNSPECIFIED ORGAN INVOLVEMENT (HCC): ICD-10-CM

## 2024-09-16 PROBLEM — L71.9 ROSACEA: Status: ACTIVE | Noted: 2024-09-16

## 2024-09-16 PROCEDURE — 99396 PREV VISIT EST AGE 40-64: CPT | Performed by: INTERNAL MEDICINE

## 2024-09-16 SDOH — ECONOMIC STABILITY: FOOD INSECURITY: WITHIN THE PAST 12 MONTHS, THE FOOD YOU BOUGHT JUST DIDN'T LAST AND YOU DIDN'T HAVE MONEY TO GET MORE.: NEVER TRUE

## 2024-09-16 SDOH — ECONOMIC STABILITY: FOOD INSECURITY: WITHIN THE PAST 12 MONTHS, YOU WORRIED THAT YOUR FOOD WOULD RUN OUT BEFORE YOU GOT MONEY TO BUY MORE.: NEVER TRUE

## 2024-09-16 SDOH — ECONOMIC STABILITY: INCOME INSECURITY: HOW HARD IS IT FOR YOU TO PAY FOR THE VERY BASICS LIKE FOOD, HOUSING, MEDICAL CARE, AND HEATING?: NOT HARD AT ALL

## 2024-09-16 ASSESSMENT — ENCOUNTER SYMPTOMS
DIARRHEA: 0
SHORTNESS OF BREATH: 0
CONSTIPATION: 0
VOMITING: 0
COUGH: 0
ABDOMINAL PAIN: 0
EYES NEGATIVE: 1
NAUSEA: 0

## 2024-09-16 ASSESSMENT — PATIENT HEALTH QUESTIONNAIRE - PHQ9
SUM OF ALL RESPONSES TO PHQ QUESTIONS 1-9: 1
2. FEELING DOWN, DEPRESSED OR HOPELESS: NOT AT ALL
1. LITTLE INTEREST OR PLEASURE IN DOING THINGS: SEVERAL DAYS
SUM OF ALL RESPONSES TO PHQ QUESTIONS 1-9: 1
SUM OF ALL RESPONSES TO PHQ9 QUESTIONS 1 & 2: 1

## 2024-09-17 LAB
ALBUMIN SERPL-MCNC: 4.6 G/DL (ref 3.8–4.9)
ALP SERPL-CCNC: 75 IU/L (ref 44–121)
ALT SERPL-CCNC: 18 IU/L (ref 0–32)
AST SERPL-CCNC: 26 IU/L (ref 0–40)
BASOPHILS # BLD AUTO: 0.1 X10E3/UL (ref 0–0.2)
BASOPHILS NFR BLD AUTO: 1 %
BILIRUB SERPL-MCNC: 0.3 MG/DL (ref 0–1.2)
BUN SERPL-MCNC: 11 MG/DL (ref 6–24)
BUN/CREAT SERPL: 12 (ref 9–23)
CALCIUM SERPL-MCNC: 9.8 MG/DL (ref 8.7–10.2)
CHLORIDE SERPL-SCNC: 105 MMOL/L (ref 96–106)
CHOLEST SERPL-MCNC: 232 MG/DL (ref 100–199)
CO2 SERPL-SCNC: 24 MMOL/L (ref 20–29)
CREAT SERPL-MCNC: 0.92 MG/DL (ref 0.57–1)
CRP SERPL-MCNC: <1 MG/L (ref 0–10)
EGFRCR SERPLBLD CKD-EPI 2021: 73 ML/MIN/1.73
EOSINOPHIL # BLD AUTO: 0.1 X10E3/UL (ref 0–0.4)
EOSINOPHIL NFR BLD AUTO: 1 %
ERYTHROCYTE [DISTWIDTH] IN BLOOD BY AUTOMATED COUNT: 14 % (ref 11.7–15.4)
ERYTHROCYTE [SEDIMENTATION RATE] IN BLOOD BY WESTERGREN METHOD: 7 MM/HR (ref 0–40)
FERRITIN SERPL-MCNC: 12 NG/ML (ref 15–150)
GLOBULIN SER CALC-MCNC: 2.3 G/DL (ref 1.5–4.5)
GLUCOSE SERPL-MCNC: 90 MG/DL (ref 70–99)
HBA1C MFR BLD: 6.1 % (ref 4.8–5.6)
HCT VFR BLD AUTO: 39.9 % (ref 34–46.6)
HDLC SERPL-MCNC: 61 MG/DL
HGB BLD-MCNC: 13.2 G/DL (ref 11.1–15.9)
IGA SERPL-MCNC: 422 MG/DL (ref 87–352)
IMM GRANULOCYTES # BLD AUTO: 0 X10E3/UL (ref 0–0.1)
IMM GRANULOCYTES NFR BLD AUTO: 0 %
IRON SATN MFR SERPL: 21 % (ref 15–55)
IRON SERPL-MCNC: 73 UG/DL (ref 27–159)
LDLC SERPL CALC-MCNC: 161 MG/DL (ref 0–99)
LYMPHOCYTES # BLD AUTO: 0.9 X10E3/UL (ref 0.7–3.1)
LYMPHOCYTES NFR BLD AUTO: 16 %
MCH RBC QN AUTO: 29.9 PG (ref 26.6–33)
MCHC RBC AUTO-ENTMCNC: 33.1 G/DL (ref 31.5–35.7)
MCV RBC AUTO: 91 FL (ref 79–97)
MONOCYTES # BLD AUTO: 0.3 X10E3/UL (ref 0.1–0.9)
MONOCYTES NFR BLD AUTO: 5 %
NEUTROPHILS # BLD AUTO: 4.3 X10E3/UL (ref 1.4–7)
NEUTROPHILS NFR BLD AUTO: 77 %
PLATELET # BLD AUTO: 322 X10E3/UL (ref 150–450)
POTASSIUM SERPL-SCNC: 4.5 MMOL/L (ref 3.5–5.2)
PROT SERPL-MCNC: 6.9 G/DL (ref 6–8.5)
RBC # BLD AUTO: 4.41 X10E6/UL (ref 3.77–5.28)
SODIUM SERPL-SCNC: 143 MMOL/L (ref 134–144)
TIBC SERPL-MCNC: 351 UG/DL (ref 250–450)
TRIGL SERPL-MCNC: 62 MG/DL (ref 0–149)
TSH SERPL DL<=0.005 MIU/L-ACNC: 1.01 UIU/ML (ref 0.45–4.5)
UIBC SERPL-MCNC: 278 UG/DL (ref 131–425)
VIT B12 SERPL-MCNC: 703 PG/ML (ref 232–1245)
VLDLC SERPL CALC-MCNC: 10 MG/DL (ref 5–40)
WBC # BLD AUTO: 5.6 X10E3/UL (ref 3.4–10.8)

## 2024-09-19 ENCOUNTER — PATIENT MESSAGE (OUTPATIENT)
Age: 54
End: 2024-09-19

## 2024-09-19 DIAGNOSIS — R21 RASH OF BODY: Primary | ICD-10-CM
